# Patient Record
Sex: FEMALE | Race: WHITE | NOT HISPANIC OR LATINO | Employment: UNEMPLOYED | URBAN - NONMETROPOLITAN AREA
[De-identification: names, ages, dates, MRNs, and addresses within clinical notes are randomized per-mention and may not be internally consistent; named-entity substitution may affect disease eponyms.]

---

## 2017-02-28 ENCOUNTER — TELEPHONE (OUTPATIENT)
Dept: MEDICAL GROUP | Facility: PHYSICIAN GROUP | Age: 32
End: 2017-02-28

## 2017-02-28 DIAGNOSIS — R06.02 SHORTNESS OF BREATH: ICD-10-CM

## 2017-02-28 NOTE — TELEPHONE ENCOUNTER
1. Caller Name: Zeian                                      Call Back Number: [phone]      Patient approves a detailed voicemail message: N\A    2. SPECIFIC Action To Be Taken: referral    3. Diagnosis/Clinical Reason for Request: SOB / knee issues    4. Specialty & Provider Name/Lab/Imaging Location: Gynocology / PT / PFT    5. Is appointment scheduled for requested order/referral: no    Patient informed they will receive a return phone call from the office ONLY if there are any questions before processing their request. Advised to call back if they haven't received a call from the referral department in 5 days.  Pt called stating that no one except for dermatology has called her for the referrals that were placed at the end of Nov '16.  I gave her the information she needed to contact to get these referrals scheduled.  I also mailed to her home, the referrals information.      She is looking for a PFT order so she can schedule this.  I seen that this was done in 9/16.  Order may be too old, please re order PFT.  Pt is looking to schedule this test.      She said thank you.

## 2017-03-07 RX ORDER — VALACYCLOVIR HYDROCHLORIDE 500 MG/1
TABLET, FILM COATED ORAL
Qty: 30 TAB | Refills: 1 | Status: SHIPPED | OUTPATIENT
Start: 2017-03-07 | End: 2018-02-02 | Stop reason: SDUPTHER

## 2017-07-25 ENCOUNTER — OFFICE VISIT (OUTPATIENT)
Dept: URGENT CARE | Facility: PHYSICIAN GROUP | Age: 32
End: 2017-07-25
Payer: OTHER GOVERNMENT

## 2017-07-25 ENCOUNTER — HOSPITAL ENCOUNTER (OUTPATIENT)
Facility: MEDICAL CENTER | Age: 32
End: 2017-07-25
Attending: NURSE PRACTITIONER
Payer: OTHER GOVERNMENT

## 2017-07-25 VITALS
BODY MASS INDEX: 23.63 KG/M2 | TEMPERATURE: 98.7 F | HEART RATE: 96 BPM | RESPIRATION RATE: 16 BRPM | WEIGHT: 147 LBS | OXYGEN SATURATION: 94 % | HEIGHT: 66 IN | DIASTOLIC BLOOD PRESSURE: 60 MMHG | SYSTOLIC BLOOD PRESSURE: 104 MMHG

## 2017-07-25 DIAGNOSIS — R31.9 URINARY TRACT INFECTION WITH HEMATURIA, SITE UNSPECIFIED: ICD-10-CM

## 2017-07-25 DIAGNOSIS — N39.0 URINARY TRACT INFECTION WITH HEMATURIA, SITE UNSPECIFIED: ICD-10-CM

## 2017-07-25 DIAGNOSIS — R30.0 DYSURIA: ICD-10-CM

## 2017-07-25 LAB
APPEARANCE UR: ABNORMAL
BILIRUB UR STRIP-MCNC: ABNORMAL MG/DL
COLOR UR AUTO: ABNORMAL
GLUCOSE UR STRIP.AUTO-MCNC: ABNORMAL MG/DL
INT CON NEG: NORMAL
INT CON POS: NORMAL
KETONES UR STRIP.AUTO-MCNC: ABNORMAL MG/DL
LEUKOCYTE ESTERASE UR QL STRIP.AUTO: ABNORMAL
NITRITE UR QL STRIP.AUTO: ABNORMAL
PH UR STRIP.AUTO: 8.5 [PH] (ref 5–8)
POC URINE PREGNANCY TEST: NORMAL
PROT UR QL STRIP: ABNORMAL MG/DL
RBC UR QL AUTO: ABNORMAL
SP GR UR STRIP.AUTO: 1.01
UROBILINOGEN UR STRIP-MCNC: ABNORMAL MG/DL

## 2017-07-25 PROCEDURE — 87077 CULTURE AEROBIC IDENTIFY: CPT

## 2017-07-25 PROCEDURE — 81025 URINE PREGNANCY TEST: CPT | Performed by: NURSE PRACTITIONER

## 2017-07-25 PROCEDURE — 87186 SC STD MICRODIL/AGAR DIL: CPT

## 2017-07-25 PROCEDURE — 81002 URINALYSIS NONAUTO W/O SCOPE: CPT | Performed by: NURSE PRACTITIONER

## 2017-07-25 PROCEDURE — 87086 URINE CULTURE/COLONY COUNT: CPT

## 2017-07-25 PROCEDURE — 99204 OFFICE O/P NEW MOD 45 MIN: CPT | Performed by: NURSE PRACTITIONER

## 2017-07-25 RX ORDER — NITROFURANTOIN 25; 75 MG/1; MG/1
100 CAPSULE ORAL 2 TIMES DAILY
Qty: 14 CAP | Refills: 0 | Status: SHIPPED | OUTPATIENT
Start: 2017-07-25 | End: 2017-08-01

## 2017-07-25 ASSESSMENT — ENCOUNTER SYMPTOMS
CHILLS: 0
MYALGIAS: 1
BACK PAIN: 1
CONSTIPATION: 0
FEVER: 0
DIARRHEA: 0
NAUSEA: 0
WEAKNESS: 0
ABDOMINAL PAIN: 1
FLANK PAIN: 0
HEADACHES: 0
DIZZINESS: 0
VOMITING: 0

## 2017-07-25 NOTE — PROGRESS NOTES
"Subjective:      Gwendolyn Hernandez is a 32 y.o. female who presents with Back Pain            Back Pain  Associated symptoms include abdominal pain and dysuria. Pertinent negatives include no fever, headaches or weakness.   Gwendolyn is a 32 year old female who is here for dysuria x 4 days. States bilat low back pain, no hematuria or dark urine. Denies fever, n/v/d, but has low pelvic pressure but will get this with menstrual cycle.  Has had a partial hysterectomy, still has ovaries and will get cysts on her ovaries. Denies yeast infections, vaginal d/c. Menstrual cycle will be starting \"soon\". States recent sexual intercourse with  in the last week. States no previous UTI history.    PMH:  has a past medical history of Anemia; Gynecological disorder; Arthritis; Black-out (not amnesia); and Infectious disease. She also has no past medical history of Blood transfusion without reported diagnosis or Cushings syndrome (CMS-[facility]).  MEDS:   Current outpatient prescriptions:   •  nitrofurantoin monohydr macro (MACROBID) 100 MG Cap, Take 1 Cap by mouth 2 times a day for 7 days., Disp: 14 Cap, Rfl: 0  •  valacyclovir (VALTREX) 500 MG Tab, Take twice a day for 3-5 days during outbreak, Disp: 30 Tab, Rfl: 1  •  naproxen (NAPROSYN) 500 MG Tab, Take 1 Tab by mouth 2 times a day, with meals., Disp: 60 Tab, Rfl: 3  ALLERGIES:   Allergies   Allergen Reactions   • Amoxicillin Hives   • Trazodone Shortness of Breath     SURGHX:   Past Surgical History   Procedure Laterality Date   • Cholecystectomy     • Other  4-1-2016 teeth extraction   • Other  3-2016     left shoulder lump removed   • Hysterectomy robotic Bilateral 5/6/2016     Procedure: HYSTERECTOMY ROBOTIC WITH MODESTA SALPINGECTOMY ;  Surgeon: Samantha Barnes M.D.;  Location: SURGERY SAME DAY [facility];  Service:      SOCHX:  reports that she has never smoked. She has never used smokeless tobacco. She reports that she does not drink alcohol or use illicit drugs.  FH: Family history " "was reviewed, no pertinent findings to report      Review of Systems   Constitutional: Negative for fever, chills and malaise/fatigue.   Gastrointestinal: Positive for abdominal pain. Negative for nausea, vomiting, diarrhea and constipation.   Genitourinary: Positive for dysuria, urgency and frequency. Negative for hematuria and flank pain.   Musculoskeletal: Positive for myalgias and back pain.   Neurological: Negative for dizziness, weakness and headaches.   All other systems reviewed and are negative.         Objective:     /60 mmHg  Pulse 96  Temp(Src) 37.1 °C (98.7 °F)  Resp 16  Ht 1.676 m (5' 6\")  Wt 66.679 kg (147 lb)  BMI 23.74 kg/m2  SpO2 94%  LMP 04/27/2015     Physical Exam   Constitutional: She is oriented to person, place, and time. Vital signs are normal. She appears well-developed and well-nourished. She is active and cooperative.  Non-toxic appearance. She does not have a sickly appearance. She does not appear ill. No distress.   HENT:   Head: Normocephalic.   Eyes: Conjunctivae and EOM are normal. Pupils are equal, round, and reactive to light.   Cardiovascular: Normal rate.    Pulmonary/Chest: Effort normal.   Abdominal: Soft. Bowel sounds are normal. She exhibits no distension. There is no tenderness. There is no rigidity, no rebound, no guarding and no CVA tenderness.   Musculoskeletal: Normal range of motion.        Lumbar back: She exhibits pain. She exhibits normal range of motion, no tenderness, no bony tenderness, no swelling, no edema, no deformity and no spasm.        Back:    Neurological: She is alert and oriented to person, place, and time.   Skin: Skin is warm and dry. She is not diaphoretic.   Vitals reviewed.              Assessment/Plan:     1. Dysuria    - POCT Urinalysis  - POCT Pregnancy: NEG    2. Urinary tract infection with hematuria, site unspecified    - URINE CULTURE(NEW); Future  - nitrofurantoin monohydr macro (MACROBID) 100 MG Cap; Take 1 Cap by mouth 2 " times a day for 7 days.  Dispense: 14 Cap; Refill: 0    Increase water intake  Urinate more frequently and empty bladder completely  Practice good toileting hygiene after bowel movements and sexual intercourse, refrain from sexual intercourse until infection cleared  Monitor for back/flank pain, difficulty urinating, blood in urine- need re-evaluation

## 2017-07-26 DIAGNOSIS — R31.9 URINARY TRACT INFECTION WITH HEMATURIA, SITE UNSPECIFIED: ICD-10-CM

## 2017-07-26 DIAGNOSIS — N39.0 URINARY TRACT INFECTION WITH HEMATURIA, SITE UNSPECIFIED: ICD-10-CM

## 2017-07-28 LAB
BACTERIA UR CULT: ABNORMAL
SIGNIFICANT IND 70042: ABNORMAL
SITE SITE: ABNORMAL
SOURCE SOURCE: ABNORMAL

## 2018-02-02 ENCOUNTER — OFFICE VISIT (OUTPATIENT)
Dept: MEDICAL GROUP | Facility: PHYSICIAN GROUP | Age: 33
End: 2018-02-02
Payer: OTHER GOVERNMENT

## 2018-02-02 ENCOUNTER — HOSPITAL ENCOUNTER (OUTPATIENT)
Dept: LAB | Facility: MEDICAL CENTER | Age: 33
End: 2018-02-02
Attending: NURSE PRACTITIONER
Payer: OTHER GOVERNMENT

## 2018-02-02 VITALS
OXYGEN SATURATION: 99 % | HEIGHT: 66 IN | BODY MASS INDEX: 23.72 KG/M2 | HEART RATE: 87 BPM | TEMPERATURE: 98.3 F | WEIGHT: 147.6 LBS | RESPIRATION RATE: 16 BRPM | SYSTOLIC BLOOD PRESSURE: 106 MMHG | DIASTOLIC BLOOD PRESSURE: 64 MMHG

## 2018-02-02 DIAGNOSIS — Z01.419 WELL WOMAN EXAM: ICD-10-CM

## 2018-02-02 DIAGNOSIS — G89.29 CHRONIC PAIN OF RIGHT KNEE: ICD-10-CM

## 2018-02-02 DIAGNOSIS — M25.561 CHRONIC PAIN OF RIGHT KNEE: ICD-10-CM

## 2018-02-02 DIAGNOSIS — Z79.891 CHRONIC USE OF OPIATE DRUGS THERAPEUTIC PURPOSES: ICD-10-CM

## 2018-02-02 DIAGNOSIS — Z20.2 EXPOSURE TO STD: ICD-10-CM

## 2018-02-02 DIAGNOSIS — B00.9 HSV (HERPES SIMPLEX VIRUS) INFECTION: ICD-10-CM

## 2018-02-02 LAB
HCV AB SER QL: NEGATIVE
HIV 1+2 AB+HIV1 P24 AG SERPL QL IA: NON REACTIVE
TREPONEMA PALLIDUM IGG+IGM AB [PRESENCE] IN SERUM OR PLASMA BY IMMUNOASSAY: NON REACTIVE

## 2018-02-02 PROCEDURE — 86803 HEPATITIS C AB TEST: CPT

## 2018-02-02 PROCEDURE — 87591 N.GONORRHOEAE DNA AMP PROB: CPT

## 2018-02-02 PROCEDURE — 36415 COLL VENOUS BLD VENIPUNCTURE: CPT

## 2018-02-02 PROCEDURE — 86780 TREPONEMA PALLIDUM: CPT

## 2018-02-02 PROCEDURE — 87491 CHLMYD TRACH DNA AMP PROBE: CPT

## 2018-02-02 PROCEDURE — 87389 HIV-1 AG W/HIV-1&-2 AB AG IA: CPT

## 2018-02-02 PROCEDURE — 99214 OFFICE O/P EST MOD 30 MIN: CPT | Performed by: NURSE PRACTITIONER

## 2018-02-02 RX ORDER — VALACYCLOVIR HYDROCHLORIDE 500 MG/1
TABLET, FILM COATED ORAL
Qty: 30 TAB | Refills: 1 | Status: SHIPPED | OUTPATIENT
Start: 2018-02-02 | End: 2018-09-06 | Stop reason: SDUPTHER

## 2018-02-02 RX ORDER — HYDROCODONE BITARTRATE AND ACETAMINOPHEN 5; 325 MG/1; MG/1
1 TABLET ORAL EVERY 8 HOURS PRN
Qty: 30 TAB | Refills: 0 | Status: SHIPPED | OUTPATIENT
Start: 2018-02-02 | End: 2018-09-06

## 2018-02-02 NOTE — PROGRESS NOTES
Chief Complaint   Patient presents with   • Annual Exam     STD check       HISTORY OF PRESENT ILLNESS: Patient is a @ age 32 here  today to discuss:     Interval history:     Hospitalizations  - NO     Injuries  NO     Illness  NO         Exposure to STD  Patient states she is possibly exposed. Will plan labs. Patient contracted HSV with her ex- so she likes to have routine checks at least once a year. No current partner. Last STD check was all negative.    HSV (herpes simplex virus) infection  Patient takes Valtrex for suppression. Last episode was December 2017. Patient needs refill.     Right knee pain  Patient states she uses hydrocodone 5 mg/ 325 mg intermittently for pain in the knee. She uses up to 30 tablets in one year. Patient has had thorough evaluation which included physical therapy, visit to orthopedics and x-ray. She normally takes naproxen for pain periodically the pain gets so intense that she will take a hydrocodone. She states that 30 tablets last her one year. She was given the information regarding the use of narcotics for severe pain and the potential problem of addiction or dependency. She is used is in the past without any difficulty. She still will be consented and urine drug screen will be performed today    Chronic use of opiate drugs therapeutic purposes  This patient is continuing to use a controlled substance (CS) on a long term basis.  The patient is thoroughly aware of the goals of treatment with the CS  The patient is aware that yearly and random urine drug screens are required.  The patient has been instructed to take the CS only as prescribed.  The patient is prohibited from sharing the CS with any other person.  The patient is instructed to inform the provider if any other CS is taken, of any alcohol or cannabis or other recreational drug use, any treatment for side effects of the CS or complications, if they have CS active rx in other states  The patient has evidence for  "a reason for the CS  The treatment plan has been discussed with the patient  The  report has been reviewed    Opioid Risk Score: 8     Interpretation of Opioid Risk Score   Score 0-3 = Low risk of abuse. Do UDS at least once per year.  Score 4-7 = Moderate risk of abuse. Do UDS 1-4 times per year.  Score 8+ = High risk of abuse. Refer to specialist.        Allergies:Amoxicillin and Trazodone    Current Outpatient Prescriptions Ordered in [facility]   Medication Sig Dispense Refill   • HYDROcodone-acetaminophen (NORCO) 5-325 MG Tab per tablet Take 1 Tab by mouth every 8 hours as needed. 30 Tab 0   • valacyclovir (VALTREX) 500 MG Tab Take twice a day for 3-5 days during outbreak 30 Tab 1   • naproxen (NAPROSYN) 500 MG Tab Take 1 Tab by mouth 2 times a day, with meals. 60 Tab 3     No current Epic-ordered facility-administered medications on file.        Past Medical History:   Diagnosis Date   • Anemia    • Arthritis    • Black-out (not amnesia)    • Gynecological disorder    • Infectious disease     pt's son has croop       Social History   Substance Use Topics   • Smoking status: Never Smoker   • Smokeless tobacco: Never Used   • Alcohol use No       Family Status   Relation Status   • Mother Alive   • Father Alive     Family History   Problem Relation Age of Onset   • Genitourinary () Mother    • Cancer Father        ROS: As documented in my HPI      Exam:  Blood pressure 106/64, pulse 87, temperature 36.8 °C (98.3 °F), resp. rate 16, height 1.676 m (5' 6\"), weight 67 kg (147 lb 9.6 oz), last menstrual period 04/27/2015, SpO2 99 %.  General:  Well nourished, well developed female in NAD  Head: Nontender scalp. No lesions  Neck: Supple. Symmetric Thyroid palpated. No bruits  Pulmonary:  Normal effort. No rales, ronchi, or wheezing.  Cardiovascular: Regular rate and rhythm without murmur.   Abdomen: Soft nontender, normal bowel sounds  Extremities: Right knee: No swelling or redness noted tender at the joint line. " Joint stability present  Psych: Alert and oriented x3.  Neurological: No focal deficits    Please note that this dictation was created using voice recognition software. I have made every reasonable attempt to correct obvious errors, but I expect that there are errors of grammar and possibly content that I did not discover before finalizing the note.    Assessment/Plan:   Exposure to STD  HEP C VIRUS ANTIBODY    HIV AG/AB COMBO ASSAY SCREENING    CHLAMYDIA/GC PCR URINE OR SWAB    RPR QUAL W/REFLEX     HEP C VIRUS ANTIBODY    HIV AG/AB COMBO ASSAY SCREENING    CHLAMYDIA/GC PCR URINE OR SWAB    RPR QUAL W/REFLEX    HSV (herpes simplex virus) infection   Valtrex refilled.     Chronic pain of right knee  [facility] PAIN MANAGEMENT SCREEN    Controlled Substance Treatment Agreement    HYDROcodone-acetaminophen (NORCO) 5-325 MG Tab per tablet    Chronic use of opiate drugs therapeutic purposes  [facility] PAIN MANAGEMENT SCREEN    Controlled Substance Treatment Agreement    HYDROcodone-acetaminophen (NORCO) 5-325 MG Tab per tablet medication to last one year #30 tablets.

## 2018-02-02 NOTE — ASSESSMENT & PLAN NOTE
Patient states she uses hydrocodone 5 mg/ 325 mg intermittently for pain in the knee. She uses up to 30 tablets in one year. Patient has had thorough evaluation which included physical therapy, visit to orthopedics and x-ray. She normally takes naproxen for pain periodically the pain gets so intense that she will take a hydrocodone. She states that 30 tablets last her one year. She was given the information regarding the use of narcotics for severe pain and the potential problem of addiction or dependency. She is used is in the past without any difficulty. She still will be consented and urine drug screen will be performed today

## 2018-02-03 LAB
C TRACH DNA SPEC QL NAA+PROBE: POSITIVE
N GONORRHOEA DNA SPEC QL NAA+PROBE: NEGATIVE
SPECIMEN SOURCE: ABNORMAL

## 2018-02-04 ENCOUNTER — TELEPHONE (OUTPATIENT)
Dept: MEDICAL GROUP | Facility: PHYSICIAN GROUP | Age: 33
End: 2018-02-04

## 2018-02-04 RX ORDER — AZITHROMYCIN 1 G/1
1 POWDER, FOR SUSPENSION ORAL ONCE
Qty: 1 EACH | Refills: 0 | Status: SHIPPED | OUTPATIENT
Start: 2018-02-04 | End: 2018-02-04

## 2018-02-05 ENCOUNTER — TELEPHONE (OUTPATIENT)
Dept: MEDICAL GROUP | Facility: PHYSICIAN GROUP | Age: 33
End: 2018-02-05

## 2018-02-05 NOTE — TELEPHONE ENCOUNTER
Positive chlamydia. RX sent to pharmacy. My chart message sent. Please call patient to back up information. I tried to leave message Sunday - voicemail.   [place] fill out County reporting paperwork.  Thanks,  Kaycee Brown

## 2018-02-06 ENCOUNTER — PATIENT MESSAGE (OUTPATIENT)
Dept: MEDICAL GROUP | Facility: PHYSICIAN GROUP | Age: 33
End: 2018-02-06

## 2018-02-06 NOTE — TELEPHONE ENCOUNTER
Barbara with [facility] calling with positive results.  Kaycee has already addressed this with patient.

## 2018-02-07 ENCOUNTER — PATIENT MESSAGE (OUTPATIENT)
Dept: MEDICAL GROUP | Facility: PHYSICIAN GROUP | Age: 33
End: 2018-02-07

## 2018-02-08 NOTE — TELEPHONE ENCOUNTER
From: Gwendolyn Hernandez  To: LAKIA Macedo  Sent: 2/7/2018 2:05 PM PST  Subject: Non-Urgent Medical Question    Can I come in before the 3 months and get tested to make sure the antibiotic worked? Can I go to the lab and get them to retest me this week or next week please? I took the medicine Monday morning. If it comes up positive after retesting then do I take another dose? Sorry this is not something I have not dealt with so I want to make sure I am clean. My mind won't stop thinking about it.

## 2018-02-12 ENCOUNTER — OFFICE VISIT (OUTPATIENT)
Dept: URGENT CARE | Facility: PHYSICIAN GROUP | Age: 33
End: 2018-02-12
Payer: OTHER GOVERNMENT

## 2018-02-12 VITALS
HEART RATE: 80 BPM | RESPIRATION RATE: 14 BRPM | WEIGHT: 150 LBS | OXYGEN SATURATION: 97 % | DIASTOLIC BLOOD PRESSURE: 60 MMHG | SYSTOLIC BLOOD PRESSURE: 110 MMHG | HEIGHT: 66 IN | BODY MASS INDEX: 24.11 KG/M2 | TEMPERATURE: 98.1 F

## 2018-02-12 DIAGNOSIS — T36.95XA ANTIBIOTIC-INDUCED YEAST INFECTION: ICD-10-CM

## 2018-02-12 DIAGNOSIS — L73.9 FOLLICULITIS: ICD-10-CM

## 2018-02-12 DIAGNOSIS — B37.9 ANTIBIOTIC-INDUCED YEAST INFECTION: ICD-10-CM

## 2018-02-12 DIAGNOSIS — Z86.19 H/O CHLAMYDIA INFECTION: ICD-10-CM

## 2018-02-12 PROCEDURE — 99214 OFFICE O/P EST MOD 30 MIN: CPT | Performed by: FAMILY MEDICINE

## 2018-02-12 RX ORDER — MUPIROCIN CALCIUM 20 MG/G
CREAM TOPICAL
Qty: 1 TUBE | Refills: 0 | Status: SHIPPED | OUTPATIENT
Start: 2018-02-12 | End: 2018-09-06

## 2018-02-12 RX ORDER — FLUCONAZOLE 150 MG/1
150 TABLET ORAL DAILY
Qty: 1 TAB | Refills: 0 | Status: SHIPPED | OUTPATIENT
Start: 2018-02-12 | End: 2018-09-06

## 2018-02-12 NOTE — PROGRESS NOTES
Subjective:      Gwendolyn Hernandez is a 32 y.o. female who presents with Medication Reaction (Possible reaction to previously given medication)    Patient was recently seen in the urgent care and diagnosed and treated for chlamydia infection with 1 g of azithromycin. She states over the past several days she has had new problem of itching of her upper or lower back and flank area she has not noticed a rash or hives. She denies any difficulty breathing no tongue swelling no shortness of breath. Wondered whether allergic reaction to antibiotic.      She's also had new vaginal itching and irritation feels that she is having a difference discharge from what she was having when she came in and was tested for chlamydia. She denies any fevers or chills no pelvic pain.    Concerned whether she is totally cleared of chamydia infection as well. No pelvic pain.     HPI  ROS Review of Systems performed. All other systems are negative except for what is listed above.     PMH:  has a past medical history of Anemia; Arthritis; Black-out (not amnesia); Gynecological disorder; and Infectious disease. She also has no past medical history of Blood transfusion without reported diagnosis or Cushings syndrome (CMS-[facility]).  MEDS:   Current Outpatient Prescriptions:   •  HYDROcodone-acetaminophen (NORCO) 5-325 MG Tab per tablet, Take 1 Tab by mouth every 8 hours as needed., Disp: 30 Tab, Rfl: 0  •  valACYclovir (VALTREX) 500 MG Tab, Take twice a day for 3-5 days during outbreak, Disp: 30 Tab, Rfl: 1  •  naproxen (NAPROSYN) 500 MG Tab, Take 1 Tab by mouth 2 times a day, with meals., Disp: 60 Tab, Rfl: 3  ALLERGIES:   Allergies   Allergen Reactions   • Amoxicillin Hives   • Trazodone Shortness of Breath   SURGHX:   Past Surgical History:   Procedure Laterality Date   • HYSTERECTOMY ROBOTIC Bilateral 5/6/2016    Procedure: HYSTERECTOMY ROBOTIC WITH MODESTA SALPINGECTOMY ;  Surgeon: Samantha Barnes M.D.;  Location: SURGERY SAME DAY [facility];  Service:   "  • OTHER  3-2016    left shoulder lump removed   • CHOLECYSTECTOMY     • OTHER  4-1-2016 teeth extraction   SOCHX:  reports that she has never smoked. She has never used smokeless tobacco. She reports that she does not drink alcohol or use drugs.  FH: Family history was reviewed, no pertinent findings to report     Objective:     /60   Pulse 80   Temp 36.7 °C (98.1 °F)   Resp 14   Ht 1.676 m (5' 6\")   Wt 68 kg (150 lb)   LMP 04/27/2015   SpO2 97%   BMI 24.21 kg/m²      Physical Exam   Constitutional: She is oriented to person, place, and time. She appears well-developed and well-nourished. No distress.   HENT:   Mouth/Throat: Oropharynx is clear and moist.   Eyes: Conjunctivae are normal.   Cardiovascular: Normal rate, regular rhythm, normal heart sounds and intact distal pulses.  Exam reveals no gallop and no friction rub.    No murmur heard.  Pulmonary/Chest: Effort normal and breath sounds normal. No respiratory distress. She has no wheezes. She has no rales. She exhibits no tenderness.   Genitourinary:   Genitourinary Comments: deferred   Musculoskeletal: Normal range of motion. She exhibits no edema, tenderness or deformity.        Arms:  Discrete small pustular lesions noted diffusely on back   Neurological: She is alert and oriented to person, place, and time.   Skin: Skin is warm and dry. Rash noted. She is not diaphoretic.   Psychiatric: She has a normal mood and affect. Her behavior is normal.          Assessment/Plan:     1. Folliculitis     2. Antibiotic-induced yeast infection     3. H/O chlamydia infection       Diflucan  Bactroban topical  Patient is severely concerned about its whether her chlamydia infection is completely resolved I stated that she could come back to urgent care or primary care provider in 7 days for repeat test of cure this is her second time having chlamydia in her lifetime          "

## 2018-03-30 ENCOUNTER — HOSPITAL ENCOUNTER (OUTPATIENT)
Dept: LAB | Facility: MEDICAL CENTER | Age: 33
End: 2018-03-30
Attending: ANESTHESIOLOGY
Payer: OTHER GOVERNMENT

## 2018-03-30 LAB
HCG SERPL QL: NEGATIVE
HCT VFR BLD AUTO: 41 % (ref 37–47)

## 2018-03-30 PROCEDURE — 36415 COLL VENOUS BLD VENIPUNCTURE: CPT

## 2018-03-30 PROCEDURE — 84703 CHORIONIC GONADOTROPIN ASSAY: CPT

## 2018-03-30 PROCEDURE — 85014 HEMATOCRIT: CPT

## 2018-05-15 ENCOUNTER — PATIENT MESSAGE (OUTPATIENT)
Dept: MEDICAL GROUP | Facility: PHYSICIAN GROUP | Age: 33
End: 2018-05-15

## 2018-05-25 ENCOUNTER — HOSPITAL ENCOUNTER (OUTPATIENT)
Facility: MEDICAL CENTER | Age: 33
End: 2018-05-25
Attending: PHYSICIAN ASSISTANT
Payer: OTHER GOVERNMENT

## 2018-05-25 ENCOUNTER — OFFICE VISIT (OUTPATIENT)
Dept: URGENT CARE | Facility: PHYSICIAN GROUP | Age: 33
End: 2018-05-25
Payer: OTHER GOVERNMENT

## 2018-05-25 VITALS
WEIGHT: 152 LBS | HEIGHT: 67 IN | HEART RATE: 72 BPM | DIASTOLIC BLOOD PRESSURE: 72 MMHG | RESPIRATION RATE: 16 BRPM | BODY MASS INDEX: 23.86 KG/M2 | TEMPERATURE: 98.1 F | SYSTOLIC BLOOD PRESSURE: 110 MMHG | OXYGEN SATURATION: 97 %

## 2018-05-25 DIAGNOSIS — R10.2 PELVIC PAIN: ICD-10-CM

## 2018-05-25 PROCEDURE — 87491 CHLMYD TRACH DNA AMP PROBE: CPT

## 2018-05-25 PROCEDURE — 87591 N.GONORRHOEAE DNA AMP PROB: CPT

## 2018-05-25 PROCEDURE — 99214 OFFICE O/P EST MOD 30 MIN: CPT | Performed by: PHYSICIAN ASSISTANT

## 2018-05-25 ASSESSMENT — ENCOUNTER SYMPTOMS
DIARRHEA: 0
ABDOMINAL PAIN: 0
CHILLS: 0
MYALGIAS: 0
NAUSEA: 0
COUGH: 0
BACK PAIN: 0
SHORTNESS OF BREATH: 0
VOMITING: 0
FLANK PAIN: 0
FEVER: 0
HEADACHES: 0

## 2018-05-25 ASSESSMENT — PAIN SCALES - GENERAL: PAINLEVEL: 4=SLIGHT-MODERATE PAIN

## 2018-05-25 NOTE — PROGRESS NOTES
"Subjective:      Gwendolyn Hernandez is a 33 y.o. female who presents with Exposure to STD (was positive in Feb)            Patient is here with complaints of pelvic pain x 3 months. She was exposed to STD 3 months ago (chlamydia). Patient was treated for Chlamydia but continues to have pain. She complains of intermittent shooting pains around where her ovaries are. She denies abnormal discharge or bleeding. Patient has hx of partial hysterectomy. She denies fever or chills. She denies urinary symptoms. She is requesting repeat STD testing.      Past Medical History:   Diagnosis Date   • Anemia    • Arthritis    • Black-out (not amnesia)    • Gynecological disorder    • Infectious disease     pt's son has croop       Past Surgical History:   Procedure Laterality Date   • HYSTERECTOMY ROBOTIC Bilateral 5/6/2016    Procedure: HYSTERECTOMY ROBOTIC WITH MODESTA SALPINGECTOMY ;  Surgeon: Samantha Barnes M.D.;  Location: SURGERY SAME DAY [facility];  Service:    • OTHER  3-2016    left shoulder lump removed   • CHOLECYSTECTOMY     • OTHER  4-1-2016 teeth extraction       Family History   Problem Relation Age of Onset   • Genitourinary () Mother    • Cancer Father        Allergies   Allergen Reactions   • Amoxicillin Hives   • Trazodone Shortness of Breath       Medications, Allergies, and current problem list reviewed today in Epic      Review of Systems   Constitutional: Negative for chills, fever and malaise/fatigue.   Respiratory: Negative for cough and shortness of breath.    Gastrointestinal: Negative for abdominal pain, diarrhea, nausea and vomiting.   Genitourinary: Negative for dysuria, flank pain, frequency, hematuria and urgency.        Pelvic pain    Musculoskeletal: Negative for back pain and myalgias.   Skin: Negative for rash.   Neurological: Negative for headaches.     All other systems reviewed and are negative.        Objective:     /72   Pulse 72   Temp 36.7 °C (98.1 °F)   Resp 16   Ht 1.702 m (5' 7\")   " Wt 68.9 kg (152 lb)   LMP 04/27/2015   SpO2 97%   BMI 23.81 kg/m²      Physical Exam   Constitutional: She is oriented to person, place, and time. She appears well-developed and well-nourished. No distress.   Pulmonary/Chest: Effort normal. No respiratory distress.   Genitourinary: Uterus normal. Cervix exhibits discharge. Cervix exhibits no motion tenderness and no friability. Right adnexum displays no mass, no tenderness and no fullness. Left adnexum displays no mass, no tenderness and no fullness. No erythema in the vagina. No signs of injury around the vagina. Vaginal discharge (white discharge ) found.   Lymphadenopathy:        Right: No inguinal adenopathy present.        Left: No inguinal adenopathy present.   Neurological: She is alert and oriented to person, place, and time. No cranial nerve deficit.   Psychiatric: She has a normal mood and affect. Her behavior is normal. Judgment and thought content normal.               Assessment/Plan:     1. Pelvic pain  CHLAMYDIA/GC PCR URINE OR SWAB    US-GYN-PELVIS TRANSVAGINAL       Will recheck GC/Chlamydia.  US pelvis ordered for patient to schedule.  Will follow-up with patient accordingly.   Patient prefers to be notified via [employer].     Differential diagnoses, Supportive care, and indications for immediate follow-up discussed with patient.   Instructed to return to clinic or nearest emergency department for any change in condition, further concerns, or worsening of symptoms.    The patient demonstrated a good understanding and agreed with the treatment plan.    Vanessa Navarro P.A.-C.

## 2018-05-26 LAB
C TRACH DNA SPEC QL NAA+PROBE: NEGATIVE
N GONORRHOEA DNA SPEC QL NAA+PROBE: NEGATIVE
SPECIMEN SOURCE: NORMAL

## 2018-06-06 ENCOUNTER — HOSPITAL ENCOUNTER (OUTPATIENT)
Dept: RADIOLOGY | Facility: MEDICAL CENTER | Age: 33
End: 2018-06-06
Attending: PHYSICIAN ASSISTANT
Payer: OTHER GOVERNMENT

## 2018-06-06 DIAGNOSIS — R10.2 PELVIC PAIN: ICD-10-CM

## 2018-06-06 PROCEDURE — 76830 TRANSVAGINAL US NON-OB: CPT

## 2018-08-15 ENCOUNTER — HOSPITAL ENCOUNTER (OUTPATIENT)
Facility: MEDICAL CENTER | Age: 33
End: 2018-08-15
Attending: PHYSICIAN ASSISTANT
Payer: OTHER GOVERNMENT

## 2018-08-15 ENCOUNTER — OFFICE VISIT (OUTPATIENT)
Dept: URGENT CARE | Facility: PHYSICIAN GROUP | Age: 33
End: 2018-08-15
Payer: OTHER GOVERNMENT

## 2018-08-15 VITALS
SYSTOLIC BLOOD PRESSURE: 108 MMHG | HEIGHT: 67 IN | HEART RATE: 90 BPM | BODY MASS INDEX: 23.89 KG/M2 | WEIGHT: 152.2 LBS | RESPIRATION RATE: 16 BRPM | OXYGEN SATURATION: 97 % | DIASTOLIC BLOOD PRESSURE: 58 MMHG | TEMPERATURE: 99 F

## 2018-08-15 DIAGNOSIS — R10.30 LOWER ABDOMINAL PAIN: ICD-10-CM

## 2018-08-15 DIAGNOSIS — N89.8 VAGINAL DISCHARGE: ICD-10-CM

## 2018-08-15 LAB
APPEARANCE UR: CLEAR
BILIRUB UR STRIP-MCNC: NORMAL MG/DL
COLOR UR AUTO: YELLOW
GLUCOSE UR STRIP.AUTO-MCNC: NORMAL MG/DL
KETONES UR STRIP.AUTO-MCNC: NORMAL MG/DL
LEUKOCYTE ESTERASE UR QL STRIP.AUTO: NORMAL
NITRITE UR QL STRIP.AUTO: NORMAL
PH UR STRIP.AUTO: 7 [PH] (ref 5–8)
PROT UR QL STRIP: NORMAL MG/DL
RBC UR QL AUTO: NORMAL
SP GR UR STRIP.AUTO: 1.02
UROBILINOGEN UR STRIP-MCNC: 0.2 MG/DL

## 2018-08-15 PROCEDURE — 87510 GARDNER VAG DNA DIR PROBE: CPT

## 2018-08-15 PROCEDURE — 81002 URINALYSIS NONAUTO W/O SCOPE: CPT | Performed by: PHYSICIAN ASSISTANT

## 2018-08-15 PROCEDURE — 87591 N.GONORRHOEAE DNA AMP PROB: CPT

## 2018-08-15 PROCEDURE — 87480 CANDIDA DNA DIR PROBE: CPT

## 2018-08-15 PROCEDURE — 99214 OFFICE O/P EST MOD 30 MIN: CPT | Performed by: PHYSICIAN ASSISTANT

## 2018-08-15 PROCEDURE — 87491 CHLMYD TRACH DNA AMP PROBE: CPT

## 2018-08-15 PROCEDURE — 87660 TRICHOMONAS VAGIN DIR PROBE: CPT

## 2018-08-15 RX ORDER — OXYCODONE AND ACETAMINOPHEN TABLETS 5; 300 MG/1; MG/1
1 TABLET ORAL EVERY 4 HOURS PRN
COMMUNITY
End: 2018-09-06

## 2018-08-15 RX ORDER — METRONIDAZOLE 500 MG/1
500 TABLET ORAL 2 TIMES DAILY
Qty: 14 TAB | Refills: 0 | Status: SHIPPED | OUTPATIENT
Start: 2018-08-15 | End: 2018-08-22

## 2018-08-15 NOTE — PROGRESS NOTES
Chief Complaint   Patient presents with   • Abdominal Pain     unable to leave sample   • Exposure to STD       HISTORY OF PRESENT ILLNESS: Patient is a 33 y.o. female who presents today for the following:    Patient comes in for evaluation of vaginal discharge and lower abdominal pain that started a few days ago.  She was recently visiting her  for a week, the first time she has seen him in approximately 6 or 7 months.  She reports a foul odor to the vaginal discharge.  She denies overt vaginal pain, dysuria, hematuria, nausea, vomiting, and fever.  She has a history of a partial hysterectomy, with 2 ovaries remaining.  She does have a history of ovarian cysts.    Patient Active Problem List    Diagnosis Date Noted   • HSV (herpes simplex virus) infection 02/02/2018   • Chronic use of opiate drugs therapeutic purposes 02/02/2018   • Skin lesion 11/30/2016   • Chronic pelvic pain in female 09/01/2016   • Slow transit constipation 09/01/2016   • Right knee pain 09/01/2016   • Shortness of breath 09/01/2016   • Exposure to STD 09/01/2016   • Abnormal uterine bleeding 05/06/2016   • Lipoma of back 02/18/2016   • Anxiety 11/10/2015   • Pelvic pain 11/10/2015   • LOC (loss of consciousness) (HCC) 11/10/2015       Allergies:Amoxicillin and Trazodone    Current Outpatient Prescriptions Ordered in [facility]   Medication Sig Dispense Refill   • oxycodone-acetaminophen (LYNOX) 5-300 MG per tablet Take 1 Tab by mouth every four hours as needed for Severe Pain.     • metroNIDAZOLE (FLAGYL) 500 MG Tab Take 1 Tab by mouth 2 Times a Day for 7 days. 14 Tab 0   • HYDROcodone-acetaminophen (NORCO) 5-325 MG Tab per tablet Take 1 Tab by mouth every 8 hours as needed. 30 Tab 0   • valACYclovir (VALTREX) 500 MG Tab Take twice a day for 3-5 days during outbreak 30 Tab 1   • mupirocin calcium (BACTROBAN) 2 % Cream Apply a small amount to affected area bid for 7-10 days per treatment round 1 Tube 0   • fluconazole (DIFLUCAN) 150 MG tablet  "Take 1 Tab by mouth every day. 1 Tab 0   • naproxen (NAPROSYN) 500 MG Tab Take 1 Tab by mouth 2 times a day, with meals. 60 Tab 3     No current Epic-ordered facility-administered medications on file.        Past Medical History:   Diagnosis Date   • Anemia    • Arthritis    • Black-out (not amnesia)    • Gynecological disorder    • Infectious disease     pt's son has croop       Social History   Substance Use Topics   • Smoking status: Never Smoker   • Smokeless tobacco: Never Used   • Alcohol use No       Family Status   Relation Status   • Mo Alive   • Fa Alive     Family History   Problem Relation Age of Onset   • Genitourinary () Mother    • Cancer Father        Review of Systems:   Constitutional ROS: No unexpected change in weight, No weakness, No fatigue  Eye ROS: No recent significant change in vision, No eye pain, redness, discharge  Ear ROS: No drainage, No tinnitus or vertigo, No recent change in hearing  Mouth/Throat ROS: No teeth or gum problems, No bleeding gums, No tongue complaints  Neck ROS: No swollen glands, No significant pain in neck  Pulmonary ROS: No chronic cough, sputum, or hemoptysis, No dyspnea on exertion, No wheezing  Cardiovascular ROS: No diaphoresis, No edema, No palpitations  Musculoskeletal/Extremities ROS: No peripheral edema, No pain, redness or swelling on the joints  Hematologic/Lymphatic ROS: No chills, No night sweats, No weight loss  Skin/Integumentary ROS: No edema, No evidence of rash, No itching      Exam:  Blood pressure 108/58, pulse 90, temperature 37.2 °C (99 °F), resp. rate 16, height 1.702 m (5' 7\"), weight 69 kg (152 lb 3.2 oz), last menstrual period 04/27/2015, SpO2 97 %.  General: Well developed, well nourished. No distress.  HEENT: Head is grossly normal.  Pulmonary: Unlabored respiratory effort. Lungs clear to auscultation, no wheezes, no rhonchi.  Cardiovascular: Regular rate and rhythm without murmur.  Neurologic: Grossly nonfocal. No facial asymmetry " noted.  : External genitalia is within normal limits.  Thin grayish/yellowish discharge is noted in the vaginal vault.  Vaginal mucosa is pink.  Slight odor is noted.  Cultures obtained.  Skin: Warm, dry, good turgor. No rashes in visible areas.   Psych: Normal mood. Alert and oriented x3. Judgment and insight is normal.    UA: Negative    Assessment/Plan:  We will treat for bacterial vaginosis as patient's symptoms and exam findings are consistent.  Will contact patient with results.  Follow for worsening or persistent symptoms.  1. Vaginal discharge  CHLAMYDIA/GC PCR URINE OR SWAB    BV+TRICH EUSEBIA+YEAST CULTURE    metroNIDAZOLE (FLAGYL) 500 MG Tab    POCT Urinalysis   2. Lower abdominal pain  CHLAMYDIA/GC PCR URINE OR SWAB    BV+TRICH EUSEBIA+YEAST CULTURE    metroNIDAZOLE (FLAGYL) 500 MG Tab    POCT Urinalysis

## 2018-08-16 LAB
C TRACH DNA SPEC QL NAA+PROBE: NEGATIVE
CANDIDA DNA VAG QL PROBE+SIG AMP: NEGATIVE
G VAGINALIS DNA VAG QL PROBE+SIG AMP: NEGATIVE
N GONORRHOEA DNA SPEC QL NAA+PROBE: NEGATIVE
SPECIMEN SOURCE: NORMAL
T VAGINALIS DNA VAG QL PROBE+SIG AMP: NEGATIVE

## 2018-08-21 ENCOUNTER — TELEPHONE (OUTPATIENT)
Dept: URGENT CARE | Facility: PHYSICIAN GROUP | Age: 33
End: 2018-08-21

## 2018-09-06 ENCOUNTER — OFFICE VISIT (OUTPATIENT)
Dept: MEDICAL GROUP | Facility: PHYSICIAN GROUP | Age: 33
End: 2018-09-06
Payer: OTHER GOVERNMENT

## 2018-09-06 VITALS
TEMPERATURE: 98.9 F | SYSTOLIC BLOOD PRESSURE: 100 MMHG | BODY MASS INDEX: 22.76 KG/M2 | HEIGHT: 67 IN | DIASTOLIC BLOOD PRESSURE: 60 MMHG | WEIGHT: 145 LBS | OXYGEN SATURATION: 96 % | HEART RATE: 84 BPM | RESPIRATION RATE: 16 BRPM

## 2018-09-06 DIAGNOSIS — B00.9 HSV (HERPES SIMPLEX VIRUS) INFECTION: ICD-10-CM

## 2018-09-06 DIAGNOSIS — G89.29 CHRONIC PAIN OF RIGHT KNEE: ICD-10-CM

## 2018-09-06 DIAGNOSIS — Z13.6 SCREENING FOR CARDIOVASCULAR CONDITION: ICD-10-CM

## 2018-09-06 DIAGNOSIS — M25.561 CHRONIC PAIN OF RIGHT KNEE: ICD-10-CM

## 2018-09-06 DIAGNOSIS — Z13.0 ENCOUNTER FOR SCREENING FOR HEMATOLOGIC DISORDER: ICD-10-CM

## 2018-09-06 PROCEDURE — 99214 OFFICE O/P EST MOD 30 MIN: CPT | Performed by: NURSE PRACTITIONER

## 2018-09-06 RX ORDER — VALACYCLOVIR HYDROCHLORIDE 500 MG/1
TABLET, FILM COATED ORAL
Qty: 30 TAB | Refills: 1 | Status: SHIPPED | OUTPATIENT
Start: 2018-09-06 | End: 2021-08-11

## 2018-09-06 ASSESSMENT — PATIENT HEALTH QUESTIONNAIRE - PHQ9: CLINICAL INTERPRETATION OF PHQ2 SCORE: 0
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
## 2018-09-06 NOTE — PATIENT INSTRUCTIONS
Labs anytime in the next week or so    X-ray of R knee    Follow up as needed    Natural products--turmeric, CoQ 10, glucosamine, fish oil    Continue with supportive measures, ACE wrap or brace, go easy on exercises when needed
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
## 2018-09-06 NOTE — ASSESSMENT & PLAN NOTE
Patient does have history of chronic outbreak of herpes infection for which she takes as needed valacyclovir.  She does need refills, this is called in for her.

## 2018-09-14 ENCOUNTER — APPOINTMENT (OUTPATIENT)
Dept: RADIOLOGY | Facility: IMAGING CENTER | Age: 33
End: 2018-09-14
Attending: NURSE PRACTITIONER
Payer: OTHER GOVERNMENT

## 2018-09-14 ENCOUNTER — HOSPITAL ENCOUNTER (OUTPATIENT)
Dept: LAB | Facility: MEDICAL CENTER | Age: 33
End: 2018-09-14
Attending: NURSE PRACTITIONER
Payer: OTHER GOVERNMENT

## 2018-09-14 ENCOUNTER — APPOINTMENT (OUTPATIENT)
Dept: URGENT CARE | Facility: PHYSICIAN GROUP | Age: 33
End: 2018-09-14
Payer: OTHER GOVERNMENT

## 2018-09-14 DIAGNOSIS — Z13.0 ENCOUNTER FOR SCREENING FOR HEMATOLOGIC DISORDER: ICD-10-CM

## 2018-09-14 DIAGNOSIS — G89.29 CHRONIC PAIN OF RIGHT KNEE: ICD-10-CM

## 2018-09-14 DIAGNOSIS — M25.561 CHRONIC PAIN OF RIGHT KNEE: ICD-10-CM

## 2018-09-14 LAB
BASOPHILS # BLD AUTO: 1 % (ref 0–1.8)
BASOPHILS # BLD: 0.08 K/UL (ref 0–0.12)
EOSINOPHIL # BLD AUTO: 0.08 K/UL (ref 0–0.51)
EOSINOPHIL NFR BLD: 1 % (ref 0–6.9)
ERYTHROCYTE [DISTWIDTH] IN BLOOD BY AUTOMATED COUNT: 41.6 FL (ref 35.9–50)
HCT VFR BLD AUTO: 41.6 % (ref 37–47)
HGB BLD-MCNC: 13.4 G/DL (ref 12–16)
IMM GRANULOCYTES # BLD AUTO: 0.03 K/UL (ref 0–0.11)
IMM GRANULOCYTES NFR BLD AUTO: 0.4 % (ref 0–0.9)
LYMPHOCYTES # BLD AUTO: 2.34 K/UL (ref 1–4.8)
LYMPHOCYTES NFR BLD: 27.9 % (ref 22–41)
MCH RBC QN AUTO: 29.7 PG (ref 27–33)
MCHC RBC AUTO-ENTMCNC: 32.2 G/DL (ref 33.6–35)
MCV RBC AUTO: 92.2 FL (ref 81.4–97.8)
MONOCYTES # BLD AUTO: 0.54 K/UL (ref 0–0.85)
MONOCYTES NFR BLD AUTO: 6.4 % (ref 0–13.4)
NEUTROPHILS # BLD AUTO: 5.31 K/UL (ref 2–7.15)
NEUTROPHILS NFR BLD: 63.3 % (ref 44–72)
NRBC # BLD AUTO: 0 K/UL
NRBC BLD-RTO: 0 /100 WBC
PLATELET # BLD AUTO: 325 K/UL (ref 164–446)
PMV BLD AUTO: 11.5 FL (ref 9–12.9)
RBC # BLD AUTO: 4.51 M/UL (ref 4.2–5.4)
WBC # BLD AUTO: 8.4 K/UL (ref 4.8–10.8)

## 2018-09-14 PROCEDURE — 36415 COLL VENOUS BLD VENIPUNCTURE: CPT

## 2018-09-14 PROCEDURE — 73564 X-RAY EXAM KNEE 4 OR MORE: CPT | Mod: TC,FY,RT | Performed by: NURSE PRACTITIONER

## 2018-09-14 PROCEDURE — 85025 COMPLETE CBC W/AUTO DIFF WBC: CPT

## 2018-09-21 ENCOUNTER — TELEPHONE (OUTPATIENT)
Dept: MEDICAL GROUP | Facility: PHYSICIAN GROUP | Age: 33
End: 2018-09-21

## 2018-09-21 NOTE — TELEPHONE ENCOUNTER
I sent the below info to patient via a [employer] message a week ago--pt still has not read message. Please call pt with message. Thank you.        ----- Message from Gwendolyn Hernandez sent at 9/21/2018  5:00 AM PDT -----  Regarding: Results  Gwendolyn,  I have reviewed the results of blood work and knee x-ray. The x-ray is normal, no evidence of dislocation, fracture or soft tissue swelling. Of course the x-ray is not able to adequately look at muscle, tendon and ligaments. Because this is ongoing, we could get you in with an orthopedics specialist or our sports medicine department for further evaluation and treatment. If you are agreeable to this I will place referral.  You blood work was normal.  Samantha

## 2018-09-21 NOTE — TELEPHONE ENCOUNTER
LVM letting pt know to call back to go over x ray and lab results. I also said that you sent a my chart message if she would like to view it that way.

## 2018-09-27 ENCOUNTER — HOSPITAL ENCOUNTER (OUTPATIENT)
Dept: LAB | Facility: MEDICAL CENTER | Age: 33
End: 2018-09-27
Attending: NURSE PRACTITIONER
Payer: OTHER GOVERNMENT

## 2018-09-27 DIAGNOSIS — Z13.6 SCREENING FOR CARDIOVASCULAR CONDITION: ICD-10-CM

## 2018-09-27 LAB
ALBUMIN SERPL BCP-MCNC: 4.2 G/DL (ref 3.2–4.9)
ALBUMIN/GLOB SERPL: 1.5 G/DL
ALP SERPL-CCNC: 53 U/L (ref 30–99)
ALT SERPL-CCNC: 34 U/L (ref 2–50)
ANION GAP SERPL CALC-SCNC: 8 MMOL/L (ref 0–11.9)
AST SERPL-CCNC: 26 U/L (ref 12–45)
BILIRUB SERPL-MCNC: 0.7 MG/DL (ref 0.1–1.5)
BUN SERPL-MCNC: 14 MG/DL (ref 8–22)
CALCIUM SERPL-MCNC: 9.2 MG/DL (ref 8.5–10.5)
CHLORIDE SERPL-SCNC: 106 MMOL/L (ref 96–112)
CHOLEST SERPL-MCNC: 161 MG/DL (ref 100–199)
CO2 SERPL-SCNC: 26 MMOL/L (ref 20–33)
CREAT SERPL-MCNC: 0.69 MG/DL (ref 0.5–1.4)
FASTING STATUS PATIENT QL REPORTED: NORMAL
GLOBULIN SER CALC-MCNC: 2.8 G/DL (ref 1.9–3.5)
GLUCOSE SERPL-MCNC: 81 MG/DL (ref 65–99)
HDLC SERPL-MCNC: 63 MG/DL
LDLC SERPL CALC-MCNC: 89 MG/DL
POTASSIUM SERPL-SCNC: 4.1 MMOL/L (ref 3.6–5.5)
PROT SERPL-MCNC: 7 G/DL (ref 6–8.2)
SODIUM SERPL-SCNC: 140 MMOL/L (ref 135–145)
TRIGL SERPL-MCNC: 44 MG/DL (ref 0–149)

## 2018-09-27 PROCEDURE — 80053 COMPREHEN METABOLIC PANEL: CPT

## 2018-09-27 PROCEDURE — 80061 LIPID PANEL: CPT

## 2018-09-27 PROCEDURE — 36415 COLL VENOUS BLD VENIPUNCTURE: CPT

## 2018-10-06 ENCOUNTER — TELEPHONE (OUTPATIENT)
Dept: MEDICAL GROUP | Facility: PHYSICIAN GROUP | Age: 33
End: 2018-10-06

## 2018-10-06 NOTE — TELEPHONE ENCOUNTER
----- Message from Gwendolyn Hernandez sent at 10/5/2018  5:00 AM PDT -----  Regarding: Mack Snow,  I have reviewed your labs, they all look great!  Samantha

## 2018-10-06 NOTE — TELEPHONE ENCOUNTER
I sent the below info to patient via a [employer] message a week ago--pt still has not read message. Please call pt with message. Thank you.

## 2019-04-03 ENCOUNTER — HOSPITAL ENCOUNTER (OUTPATIENT)
Dept: LAB | Facility: MEDICAL CENTER | Age: 34
End: 2019-04-03
Attending: ANESTHESIOLOGY
Payer: OTHER GOVERNMENT

## 2019-04-03 LAB — HCT VFR BLD AUTO: 41.9 % (ref 37–47)

## 2019-04-03 PROCEDURE — 85014 HEMATOCRIT: CPT

## 2019-04-03 PROCEDURE — 36415 COLL VENOUS BLD VENIPUNCTURE: CPT

## 2020-11-06 ENCOUNTER — HOSPITAL ENCOUNTER (EMERGENCY)
Facility: MEDICAL CENTER | Age: 35
End: 2020-11-07
Attending: EMERGENCY MEDICINE
Payer: OTHER GOVERNMENT

## 2020-11-06 ENCOUNTER — OFFICE VISIT (OUTPATIENT)
Dept: URGENT CARE | Facility: CLINIC | Age: 35
End: 2020-11-06
Payer: OTHER GOVERNMENT

## 2020-11-06 VITALS
DIASTOLIC BLOOD PRESSURE: 60 MMHG | RESPIRATION RATE: 14 BRPM | SYSTOLIC BLOOD PRESSURE: 102 MMHG | OXYGEN SATURATION: 95 % | BODY MASS INDEX: 24.57 KG/M2 | HEIGHT: 67 IN | TEMPERATURE: 98.3 F | WEIGHT: 156.53 LBS | HEART RATE: 84 BPM

## 2020-11-06 DIAGNOSIS — R07.1 INSPIRATORY PAIN: ICD-10-CM

## 2020-11-06 DIAGNOSIS — R11.0 NAUSEA: ICD-10-CM

## 2020-11-06 DIAGNOSIS — R07.9 CHEST PAIN, UNSPECIFIED TYPE: ICD-10-CM

## 2020-11-06 DIAGNOSIS — R10.13 EPIGASTRIC PAIN: ICD-10-CM

## 2020-11-06 DIAGNOSIS — R10.30 LOWER ABDOMINAL PAIN: ICD-10-CM

## 2020-11-06 LAB
ALBUMIN SERPL BCP-MCNC: 4.2 G/DL (ref 3.2–4.9)
ALBUMIN/GLOB SERPL: 1.7 G/DL
ALP SERPL-CCNC: 65 U/L (ref 30–99)
ALT SERPL-CCNC: 20 U/L (ref 2–50)
ANION GAP SERPL CALC-SCNC: 10 MMOL/L (ref 7–16)
APPEARANCE UR: CLEAR
AST SERPL-CCNC: 18 U/L (ref 12–45)
BASOPHILS # BLD AUTO: 0.8 % (ref 0–1.8)
BASOPHILS # BLD: 0.07 K/UL (ref 0–0.12)
BILIRUB SERPL-MCNC: 0.5 MG/DL (ref 0.1–1.5)
BILIRUB UR QL STRIP.AUTO: NEGATIVE
BUN SERPL-MCNC: 9 MG/DL (ref 8–22)
CALCIUM SERPL-MCNC: 9 MG/DL (ref 8.5–10.5)
CHLORIDE SERPL-SCNC: 106 MMOL/L (ref 96–112)
CO2 SERPL-SCNC: 24 MMOL/L (ref 20–33)
COLOR UR: YELLOW
CREAT SERPL-MCNC: 0.62 MG/DL (ref 0.5–1.4)
EKG IMPRESSION: NORMAL
EOSINOPHIL # BLD AUTO: 0.09 K/UL (ref 0–0.51)
EOSINOPHIL NFR BLD: 1 % (ref 0–6.9)
ERYTHROCYTE [DISTWIDTH] IN BLOOD BY AUTOMATED COUNT: 45 FL (ref 35.9–50)
GLOBULIN SER CALC-MCNC: 2.5 G/DL (ref 1.9–3.5)
GLUCOSE SERPL-MCNC: 92 MG/DL (ref 65–99)
GLUCOSE UR STRIP.AUTO-MCNC: NEGATIVE MG/DL
HCT VFR BLD AUTO: 40 % (ref 37–47)
HGB BLD-MCNC: 13 G/DL (ref 12–16)
IMM GRANULOCYTES # BLD AUTO: 0.03 K/UL (ref 0–0.11)
IMM GRANULOCYTES NFR BLD AUTO: 0.3 % (ref 0–0.9)
KETONES UR STRIP.AUTO-MCNC: NEGATIVE MG/DL
LEUKOCYTE ESTERASE UR QL STRIP.AUTO: NEGATIVE
LIPASE SERPL-CCNC: 17 U/L (ref 11–82)
LYMPHOCYTES # BLD AUTO: 2.44 K/UL (ref 1–4.8)
LYMPHOCYTES NFR BLD: 26.2 % (ref 22–41)
MCH RBC QN AUTO: 30.4 PG (ref 27–33)
MCHC RBC AUTO-ENTMCNC: 32.5 G/DL (ref 33.6–35)
MCV RBC AUTO: 93.7 FL (ref 81.4–97.8)
MICRO URNS: NORMAL
MONOCYTES # BLD AUTO: 0.51 K/UL (ref 0–0.85)
MONOCYTES NFR BLD AUTO: 5.5 % (ref 0–13.4)
NEUTROPHILS # BLD AUTO: 6.18 K/UL (ref 2–7.15)
NEUTROPHILS NFR BLD: 66.2 % (ref 44–72)
NITRITE UR QL STRIP.AUTO: NEGATIVE
NRBC # BLD AUTO: 0 K/UL
NRBC BLD-RTO: 0 /100 WBC
PH UR STRIP.AUTO: 7 [PH] (ref 5–8)
PLATELET # BLD AUTO: 337 K/UL (ref 164–446)
PMV BLD AUTO: 11.1 FL (ref 9–12.9)
POTASSIUM SERPL-SCNC: 3.8 MMOL/L (ref 3.6–5.5)
PROT SERPL-MCNC: 6.7 G/DL (ref 6–8.2)
PROT UR QL STRIP: NEGATIVE MG/DL
RBC # BLD AUTO: 4.27 M/UL (ref 4.2–5.4)
RBC UR QL AUTO: NEGATIVE
SODIUM SERPL-SCNC: 140 MMOL/L (ref 135–145)
SP GR UR STRIP.AUTO: 1.02
UROBILINOGEN UR STRIP.AUTO-MCNC: 0.2 MG/DL
WBC # BLD AUTO: 9.3 K/UL (ref 4.8–10.8)

## 2020-11-06 PROCEDURE — 80053 COMPREHEN METABOLIC PANEL: CPT

## 2020-11-06 PROCEDURE — 83690 ASSAY OF LIPASE: CPT

## 2020-11-06 PROCEDURE — 99284 EMERGENCY DEPT VISIT MOD MDM: CPT

## 2020-11-06 PROCEDURE — 36415 COLL VENOUS BLD VENIPUNCTURE: CPT

## 2020-11-06 PROCEDURE — 85025 COMPLETE CBC W/AUTO DIFF WBC: CPT

## 2020-11-06 PROCEDURE — 84484 ASSAY OF TROPONIN QUANT: CPT

## 2020-11-06 PROCEDURE — 81003 URINALYSIS AUTO W/O SCOPE: CPT

## 2020-11-06 PROCEDURE — 93005 ELECTROCARDIOGRAM TRACING: CPT | Performed by: EMERGENCY MEDICINE

## 2020-11-06 PROCEDURE — 99214 OFFICE O/P EST MOD 30 MIN: CPT | Performed by: NURSE PRACTITIONER

## 2020-11-06 PROCEDURE — 93000 ELECTROCARDIOGRAM COMPLETE: CPT | Performed by: NURSE PRACTITIONER

## 2020-11-06 PROCEDURE — 93005 ELECTROCARDIOGRAM TRACING: CPT

## 2020-11-06 RX ORDER — ONDANSETRON 2 MG/ML
4 INJECTION INTRAMUSCULAR; INTRAVENOUS ONCE
Status: COMPLETED | OUTPATIENT
Start: 2020-11-07 | End: 2020-11-07

## 2020-11-06 ASSESSMENT — ENCOUNTER SYMPTOMS
CHILLS: 0
PALPITATIONS: 0
DIARRHEA: 0
NAUSEA: 1
HEADACHES: 0
VOMITING: 0
CHILLS: 0
SHORTNESS OF BREATH: 0
VOMITING: 0
HEMOPTYSIS: 0
NECK PAIN: 0
HEARTBURN: 0
DIAPHORESIS: 0
FEVER: 0
ORTHOPNEA: 0
WHEEZING: 0
COUGH: 0
NAUSEA: 1
ABDOMINAL PAIN: 0
SPUTUM PRODUCTION: 0
ABDOMINAL PAIN: 1
FEVER: 0
BACK PAIN: 0
SHORTNESS OF BREATH: 0
DIZZINESS: 0

## 2020-11-06 ASSESSMENT — PAIN DESCRIPTION - DESCRIPTORS: DESCRIPTORS: ACHING;DULL

## 2020-11-06 NOTE — PROGRESS NOTES
"Subjective:      Gwendolyn Hernandez is a 35 y.o. female who presents with Breathing Problem (Pt states when she inhales she feels a sudden pain at the base of her sternum.)            Gwendolyn comes in today with a complaint of chest and epigastric pain on inspiration.  She notes a sharp pain \"behind the sternum\" at the inferior aspect.  She first noted waxing and waning symptoms 2 days ago.  She denies any known trauma or strain.  Today symptoms worsened and are persistent.  She describes a bandlike pain on deep breathing at the diaphragm level.  She notes mild nausea.  Denies any heartburn, vomiting, fever, chills, myalgias, URI symptoms, cough, shortness of breath, wheezing, or rash.  No history of similar symptoms.  She has not tried any medications to relieve the symptoms.  Medical history unremarkable for cardiovascular disease.  Surgical history significant for hysterectomy and cholecystectomy.  Non-smoker.        Review of Systems   Constitutional: Negative for chills, diaphoresis, fever and malaise/fatigue.   Respiratory: Negative for cough, hemoptysis, sputum production, shortness of breath and wheezing.    Cardiovascular: Positive for chest pain. Negative for palpitations, orthopnea and leg swelling.   Gastrointestinal: Positive for nausea. Negative for abdominal pain, diarrhea, heartburn and vomiting.   Musculoskeletal: Negative for back pain and neck pain.   Skin: Negative for rash.   Neurological: Negative for dizziness and headaches.     Medications, Allergies, and current problem list reviewed today in Epic     Objective:     Blood Pressure 102/60   Pulse 84   Temperature 36.8 °C (98.3 °F) (Temporal)   Respiration 14   Height 1.702 m (5' 7\")   Weight 71 kg (156 lb 8.4 oz)   Last Menstrual Period 04/27/2015   Oxygen Saturation 95%   Body Mass Index 24.52 kg/m²      Physical Exam  Vitals signs reviewed.   Constitutional:       General: She is not in acute distress.     Appearance: Normal appearance. " She is well-developed and normal weight. She is not toxic-appearing or diaphoretic.   HENT:      Head: Normocephalic.      Nose: Nose normal.      Mouth/Throat:      Pharynx: No oropharyngeal exudate.   Eyes:      General: No scleral icterus.        Right eye: No discharge.         Left eye: No discharge.      Conjunctiva/sclera: Conjunctivae normal.      Pupils: Pupils are equal, round, and reactive to light.   Neck:      Musculoskeletal: Neck supple. No neck rigidity.      Thyroid: No thyromegaly.      Vascular: No JVD.      Trachea: No tracheal deviation.   Cardiovascular:      Rate and Rhythm: Normal rate and regular rhythm.      Heart sounds: Normal heart sounds. No murmur. No friction rub. No gallop.    Pulmonary:      Effort: Pulmonary effort is normal. No respiratory distress.      Breath sounds: Normal breath sounds. No stridor. No wheezing, rhonchi or rales.   Chest:      Chest wall: No tenderness.   Abdominal:      General: Abdomen is flat. Bowel sounds are normal. There is no distension.      Palpations: Abdomen is soft.      Tenderness: There is abdominal tenderness in the epigastric area. There is no right CVA tenderness, left CVA tenderness, guarding or rebound. Negative signs include Pacheco's sign, Rovsing's sign and McBurney's sign.   Lymphadenopathy:      Cervical: No cervical adenopathy.   Skin:     General: Skin is warm and dry.      Capillary Refill: Capillary refill takes less than 2 seconds.      Findings: No erythema or rash.      Comments: No shingles type rash or other lesions noted on chest wall.  NO bruising, swelling, or erythema.     Neurological:      Mental Status: She is alert and oriented to person, place, and time.      Motor: No weakness.      Coordination: Coordination normal.      Gait: Gait normal.   Psychiatric:         Mood and Affect: Mood normal.         Behavior: Behavior normal.         Thought Content: Thought content normal.       In clinic medication: GI cocktail.   Patient tolerated well with no relief of symptoms noted.    EKG:  Normal sinus rhythm with no ST elevation or T wave inverstion.  Rate 69.    QRSD 94    QTc 422  P axis 58  QRS axis 68  T axis 51            Assessment/Plan:        1. Chest pain, unspecified type  EKG - Clinic Performed   2. Epigastric pain  GI cocktail   3. Inspiratory pain  EKG - Clinic Performed     Discussed exam findings with Gwendolyn.  Etiology unclear.  Differential reviewed including GI, cardiovascular, pulmonary, musculoskeletal or other unknown.  Advised no x-ray or other advanced diagnostics available in this clinic.  Discussed continued outpatient evaluation versus ED care.  At this time, ED evaluation and care is recommended due to access to high level diagnostics and care and broad unknown etiology.  She verbalized understanding of and agreed with plan of care.  She will travel via private car.

## 2020-11-07 ENCOUNTER — APPOINTMENT (OUTPATIENT)
Dept: RADIOLOGY | Facility: MEDICAL CENTER | Age: 35
End: 2020-11-07
Attending: EMERGENCY MEDICINE
Payer: OTHER GOVERNMENT

## 2020-11-07 VITALS
HEIGHT: 67 IN | BODY MASS INDEX: 24.6 KG/M2 | TEMPERATURE: 98.2 F | OXYGEN SATURATION: 96 % | DIASTOLIC BLOOD PRESSURE: 62 MMHG | HEART RATE: 65 BPM | SYSTOLIC BLOOD PRESSURE: 103 MMHG | WEIGHT: 156.75 LBS | RESPIRATION RATE: 14 BRPM

## 2020-11-07 LAB — TROPONIN T SERPL-MCNC: <6 NG/L (ref 6–19)

## 2020-11-07 PROCEDURE — 700111 HCHG RX REV CODE 636 W/ 250 OVERRIDE (IP): Performed by: EMERGENCY MEDICINE

## 2020-11-07 PROCEDURE — 71045 X-RAY EXAM CHEST 1 VIEW: CPT

## 2020-11-07 PROCEDURE — 96375 TX/PRO/DX INJ NEW DRUG ADDON: CPT

## 2020-11-07 PROCEDURE — 700117 HCHG RX CONTRAST REV CODE 255: Performed by: EMERGENCY MEDICINE

## 2020-11-07 PROCEDURE — 74177 CT ABD & PELVIS W/CONTRAST: CPT

## 2020-11-07 PROCEDURE — 96374 THER/PROPH/DIAG INJ IV PUSH: CPT

## 2020-11-07 RX ORDER — ONDANSETRON 4 MG/1
4 TABLET, ORALLY DISINTEGRATING ORAL EVERY 6 HOURS PRN
Qty: 10 TAB | Refills: 0 | Status: SHIPPED | OUTPATIENT
Start: 2020-11-07 | End: 2021-08-11

## 2020-11-07 RX ORDER — NAPROXEN 500 MG/1
500 TABLET ORAL
Qty: 30 TAB | Refills: 0 | Status: SHIPPED | OUTPATIENT
Start: 2020-11-07 | End: 2021-08-11

## 2020-11-07 RX ADMIN — IOHEXOL 80 ML: 350 INJECTION, SOLUTION INTRAVENOUS at 01:51

## 2020-11-07 RX ADMIN — ONDANSETRON 4 MG: 2 INJECTION INTRAMUSCULAR; INTRAVENOUS at 00:35

## 2020-11-07 RX ADMIN — FENTANYL CITRATE 50 MCG: 50 INJECTION, SOLUTION INTRAMUSCULAR; INTRAVENOUS at 00:35

## 2020-11-07 NOTE — ED TRIAGE NOTES
Gwendolyn Hernandez  35 y.o.  female  Chief Complaint   Patient presents with   • Abdominal Pain     c/o lower abdominal pain contant dull ache since yesterday. pain radiates to lower back and epigastric area. + nausea denies vomiting. seen at Urgent care today and was told to come to ER.

## 2020-11-07 NOTE — ED PROVIDER NOTES
ED Provider Note    Scribed for Anna Pacheco M.D. by Regulo Felipe. 11/6/2020, 11:42 PM.    Primary care provider: [facility] Gaona  Means of arrival: Walk-in  History obtained from: Patient  History limited by: None    CHIEF COMPLAINT  Chief Complaint   Patient presents with   • Abdominal Pain     c/o lower abdominal pain contant dull ache since yesterday. pain radiates to lower back and epigastric area. + nausea denies vomiting. seen at Urgent care today and was told to come to ER.        HPI  Gwendolyn Hernandez is a 35 y.o. female who presents to the Emergency Department for acute, waxing and waning lower abdominal pain onset 2 days ago. Patient describes the pain as a dull ache which radiates up to her epigastric region and into her chest. Her pain is slightly exacerbated with deep breathes.  She is initially evaluated at urgent care and sent here for further evaluation.  She reports being nauseous but denies any vomiting, fever, chills, or shortness of breath.  Has no known history of cardiac disease.  No history of pulmonary embolus or DVT.  Has a history of PCOS status post hysterectomy.  Patient is also had a cholecystectomy in the past.    EKG from earlier today at  was reviewed and was unremarkable.     REVIEW OF SYSTEMS  Review of Systems   Constitutional: Negative for chills and fever.   Respiratory: Negative for shortness of breath.    Cardiovascular: Positive for chest pain.   Gastrointestinal: Positive for abdominal pain and nausea. Negative for vomiting.   All other systems reviewed and are negative.    PAST MEDICAL HISTORY   has a past medical history of Anemia, Arthritis, Black-out (not amnesia), Gynecological disorder, and Infectious disease.    SURGICAL HISTORY   has a past surgical history that includes cholecystectomy; other (4-1-2016 teeth extraction); other (3-2016); and hysterectomy robotic (Bilateral, 5/6/2016).    SOCIAL HISTORY  Social History     Tobacco Use   • Smoking  "status: Never Smoker   • Smokeless tobacco: Never Used   Substance Use Topics   • Alcohol use: No     Alcohol/week: 0.0 oz   • Drug use: No      Social History     Substance and Sexual Activity   Drug Use No       FAMILY HISTORY  Family History   Problem Relation Age of Onset   • Genitourinary () Problems Mother    • Cancer Father        CURRENT MEDICATIONS  Current Outpatient Medications   Medication Instructions   • GI cocktail 50 mL, Oral, ONCE, Shake well, room temperature   • naproxen (NAPROSYN) 500 mg, Oral, 2 TIMES DAILY WITH MEALS   • valACYclovir (VALTREX) 500 MG Tab Take twice a day for 3-5 days during outbreak        ALLERGIES  Allergies   Allergen Reactions   • Amoxicillin Hives   • Norco [Apap-Fd&C Yellow #10 Al Jenkins-Hydrocodone] Unspecified     Severe headache   • Percocet [Apap-Fd&C Red #40 Al Jenkins-Oxycodone] Unspecified     Possible migraine   • Trazodone Shortness of Breath       PHYSICAL EXAM  VITAL SIGNS: /59   Pulse 79   Temp 36.7 °C (98.1 °F) (Temporal)   Resp 14   Ht 1.702 m (5' 7\")   Wt 71.1 kg (156 lb 12 oz)   LMP 04/27/2015   SpO2 97%   BMI 24.55 kg/m²   Vitals reviewed by myself.  Physical Exam  Nursing note and vitals reviewed.  Constitutional: Well-developed and well-nourished.   HENT: Head is normocephalic and atraumatic.  Eyes: extra-ocular movements intact  Cardiovascular: Regular rate and regular rhythm. No murmur heard.  Pulmonary/Chest: Breath sounds normal. No wheezes or rales.   Abdominal: Bilateral lower quadrant tenderness to palpation, no rebound, no guarding, nonperitoneal. Soft. No distention.    Musculoskeletal: Extremities exhibit normal range of motion without edema or tenderness.   Neurological: Awake and alert  Skin: Skin is warm and dry. No rash.       DIAGNOSTIC STUDIES  LABS  Labs Reviewed   CBC WITH DIFFERENTIAL - Abnormal; Notable for the following components:       Result Value    MCHC 32.5 (*)     All other components within normal limits   COMP " METABOLIC PANEL   LIPASE   URINALYSIS,CULTURE IF INDICATED    Narrative:     Indication for culture:->Unexplained new onset of Flank pain  and/or Costovertebral angle tenderness   ESTIMATED GFR   TROPONIN     All labs reviewed by me.    RADIOLOGY  CT-ABDOMEN-PELVIS WITH   Final Result         1.  No acute abnormality.   2.  Hepatomegaly      DX-CHEST-PORTABLE (1 VIEW)   Final Result         1.  No acute cardiopulmonary disease.        The radiologist's interpretation of all radiological studies have been reviewed by me.    REASSESSMENT    11:42 PM - Patient seen and examined at bedside. Discussed plan of care, including labs/imaging and treating her symptoms with medications. Patient agrees to the plan of care.      1:42 AM - Patient reports feeling improved prior to being escorted to CT.     2:22 AM - Patient was reevaluated at bedside. Discussed lab and radiology results with the patient and informed them they are reassuring. Return precautions were discussed with the patient, and they were cleared for discharge at this time. Patient was understanding and agreeable to discharge.     COURSE & MEDICAL DECISION MAKING  Nursing notes, VS, PMSFHx reviewed in chart.    Patient is a 35-year-old female who comes in for evaluation of abdominal pain radiating to her chest.  Differential diagnosis includes ovarian cyst, viral syndrome, colitis, diverticulitis, appendicitis.  Diagnostic work-up includes labs and CT of the abdomen.  Given her pain radiating to the chest I reviewed EKG from urgent care which was unremarkable.  Will obtain a screening troponin and chest x-ray as well.  Patient is PERC negative making PE unlikely.    Patient's initial vitals are within normal limits, she is well-appearing on exam.  She is treated with fentanyl after which she feels greatly improved.  Labs returned and are unremarkable.  Troponin is normal.  Heart score is 0 making acute coronary syndrome unlikely.  Urinalysis demonstrates no  signs of infection.  Chest x-ray demonstrates no acute cardiopulmonary processes.  CT of the abdomen demonstrates no acute findings, mild hepatomegaly.  Therefore patient is reassured and advised on symptomatic management of likely viral illness.  She is given strict return precautions and discharged in stable condition.    The patient will return for new or worsening symptoms and is stable at the time of discharge.    The patient is referred to a primary physician for blood pressure management, diabetic screening, and for all other preventative health concerns.    DISPOSITION:  Patient will be discharged home in stable condition.    FOLLOW UP:  Samantha Vera, LANE.P.R.N.  [address] [place] [address]  [phone]            OUTPATIENT MEDICATIONS:  Discharge Medication List as of 11/7/2020  2:33 AM      START taking these medications    Details   ondansetron (ZOFRAN ODT) 4 MG TABLET DISPERSIBLE Take 1 Tab by mouth every 6 hours as needed for Nausea., Disp-10 Tab, R-0, Print Rx Paper               FINAL IMPRESSION  1. Lower abdominal pain    2. Nausea          Regulo GONZÁLES (Cheri), am scribing for, and in the presence of, Anna Pacheco M.D..    Electronically signed by: Regulo Felipe (Cheri), 11/6/2020    Anna GONZÁLES M.D. personally performed the services described in this documentation, as scribed by Regulo Felipe in my presence, and it is both accurate and complete. C.    The note accurately reflects work and decisions made by me.  Anna Pacheco M.D.  11/7/2020  4:49 AM

## 2020-11-07 NOTE — ED NOTES
Pt rounded on by RN. Pt reports pain has slightly improved in relation to initial triage, now rated 4/10 in lower abdomen. Vitals obtained and charted. Blood drawn and tubed to lab. Pt placed back in lobby, educated to notify staff of changes.

## 2021-01-08 ENCOUNTER — HOSPITAL ENCOUNTER (OUTPATIENT)
Facility: MEDICAL CENTER | Age: 36
End: 2021-01-08
Attending: PHYSICIAN ASSISTANT
Payer: OTHER GOVERNMENT

## 2021-01-08 ENCOUNTER — OFFICE VISIT (OUTPATIENT)
Dept: URGENT CARE | Facility: PHYSICIAN GROUP | Age: 36
End: 2021-01-08
Payer: OTHER GOVERNMENT

## 2021-01-08 VITALS
RESPIRATION RATE: 16 BRPM | HEART RATE: 85 BPM | DIASTOLIC BLOOD PRESSURE: 72 MMHG | OXYGEN SATURATION: 97 % | HEIGHT: 67 IN | TEMPERATURE: 98.2 F | BODY MASS INDEX: 25.9 KG/M2 | WEIGHT: 165 LBS | SYSTOLIC BLOOD PRESSURE: 108 MMHG

## 2021-01-08 DIAGNOSIS — Z20.822 CLOSE EXPOSURE TO COVID-19 VIRUS: ICD-10-CM

## 2021-01-08 DIAGNOSIS — R43.2 LOSS OF TASTE: ICD-10-CM

## 2021-01-08 LAB — COVID ORDER STATUS COVID19: NORMAL

## 2021-01-08 PROCEDURE — U0005 INFEC AGEN DETEC AMPLI PROBE: HCPCS

## 2021-01-08 PROCEDURE — U0003 INFECTIOUS AGENT DETECTION BY NUCLEIC ACID (DNA OR RNA); SEVERE ACUTE RESPIRATORY SYNDROME CORONAVIRUS 2 (SARS-COV-2) (CORONAVIRUS DISEASE [COVID-19]), AMPLIFIED PROBE TECHNIQUE, MAKING USE OF HIGH THROUGHPUT TECHNOLOGIES AS DESCRIBED BY CMS-2020-01-R: HCPCS

## 2021-01-08 PROCEDURE — 99213 OFFICE O/P EST LOW 20 MIN: CPT | Mod: CS | Performed by: PHYSICIAN ASSISTANT

## 2021-01-08 RX ORDER — METRONIDAZOLE 500 MG/1
TABLET ORAL
COMMUNITY
Start: 2020-11-10 | End: 2023-02-06

## 2021-01-08 RX ORDER — IBUPROFEN 800 MG/1
TABLET ORAL
COMMUNITY
Start: 2020-11-10 | End: 2023-07-26

## 2021-01-08 RX ORDER — VALACYCLOVIR HYDROCHLORIDE 1 G/1
TABLET, FILM COATED ORAL
COMMUNITY
Start: 2020-11-03 | End: 2021-10-29

## 2021-01-08 RX ORDER — VALACYCLOVIR HYDROCHLORIDE 1 G/1
TABLET, FILM COATED ORAL
COMMUNITY
Start: 2021-01-05 | End: 2023-02-06 | Stop reason: SDUPTHER

## 2021-01-08 ASSESSMENT — ENCOUNTER SYMPTOMS
COUGH: 1
HEADACHES: 1
SPUTUM PRODUCTION: 0
DIARRHEA: 1
FEVER: 0
CHILLS: 1
VOMITING: 0
ABDOMINAL PAIN: 0
NAUSEA: 0
SHORTNESS OF BREATH: 0
WHEEZING: 0
SORE THROAT: 0
MYALGIAS: 1

## 2021-01-08 ASSESSMENT — FIBROSIS 4 INDEX: FIB4 SCORE: 0.43

## 2021-01-08 NOTE — PROGRESS NOTES
"Subjective:   Gwendolyn Hernandez  is a 35 y.o. female who presents for Coronavirus Screening (headache x2-3 days, sore throat, runny nose, lighheaded, loss of taste. positive exposure last month. )      HPI    Patient comes to clinic complaining of headache times last 2 to 3 days and loose stool.  She awoke this morning with loss of taste and became concerned for possible Covid exposure.  She denies fevers but has noted chills and body aches.  She notes mild cough without production or wheezing.  She denies sore throat or ear pain.  She denies history of asthma bronchitis and thinks she may have had childhood pneumonia.  Denies nausea vomiting or abdominal pain.  She denies loss of smell.  She is unclear closure but has been around a number of people known to be positive for Covid.  She denies treatments tried.  She declines treatment medications today.    Review of Systems   Constitutional: Positive for chills. Negative for fever.   HENT: Positive for congestion. Negative for ear pain and sore throat.    Respiratory: Positive for cough. Negative for sputum production, shortness of breath and wheezing.    Gastrointestinal: Positive for diarrhea. Negative for abdominal pain, nausea and vomiting.   Musculoskeletal: Positive for myalgias.   Skin: Negative for rash.   Neurological: Positive for headaches.       Allergies   Allergen Reactions   • Amoxicillin Hives   • Norco [Apap-Fd&C Yellow #10 Al Jenkins-Hydrocodone] Unspecified     Severe headache   • Percocet [Apap-Fd&C Red #40 Al Jenkins-Oxycodone] Unspecified     Possible migraine   • Trazodone Shortness of Breath        Objective:   /72   Pulse 85   Temp 36.8 °C (98.2 °F) (Temporal)   Resp 16   Ht 1.702 m (5' 7\")   Wt 74.8 kg (165 lb)   LMP 04/27/2015   SpO2 97%   BMI 25.84 kg/m²     Physical Exam  Vitals signs and nursing note reviewed.   Constitutional:       General: She is not in acute distress.     Appearance: She is well-developed. She is not diaphoretic. "   HENT:      Head: Normocephalic and atraumatic.      Right Ear: Tympanic membrane, ear canal and external ear normal.      Left Ear: Tympanic membrane, ear canal and external ear normal.      Nose: Congestion present.      Mouth/Throat:      Pharynx: Uvula midline. Posterior oropharyngeal erythema ( mild PND) present. No oropharyngeal exudate.      Tonsils: No tonsillar abscesses.   Eyes:      General: Lids are normal. No scleral icterus.        Right eye: No discharge.         Left eye: No discharge.      Conjunctiva/sclera: Conjunctivae normal.   Neck:      Musculoskeletal: Neck supple.   Pulmonary:      Effort: Pulmonary effort is normal. No respiratory distress.      Breath sounds: No decreased breath sounds, wheezing, rhonchi or rales.   Musculoskeletal: Normal range of motion.   Lymphadenopathy:      Cervical: Cervical adenopathy ( mild bilat) present.   Skin:     General: Skin is warm and dry.      Coloration: Skin is not pale.      Findings: No erythema.   Neurological:      Mental Status: She is alert and oriented to person, place, and time. She is not disoriented.   Psychiatric:         Speech: Speech normal.         Behavior: Behavior normal.     COVID pending    Assessment/Plan:   1. Close exposure to COVID-19 virus  - COVID/SARS CoV-2 PCR; Future    2. Loss of taste    Other orders  - ibuprofen (MOTRIN) 800 MG Tab; TAKE 1 TABLET BY MOUTH THREE TIMES DAILY WITH FOOD OR MILK AS NEEDED FOR PAIN FOR 10 DAYS  - metroNIDAZOLE (FLAGYL) 500 MG Tab; TAKE 1 TABLET BY MOUTH EVERY 12 HOURS FOR 7 DAYS  - valacyclovir (VALTREX) 1 GM Tab; Take  by mouth.  - valacyclovir (VALTREX) 1 GM Tab  Supportive care is reviewed with patient/caregiver - recommend to push PO fluids and electrolytes, over-the-counter symptom support medications reviewed, ER precautions with worsened symptoms, quarantine recommendations reviewed, sent with letter, MyChart for results of testing  Return to clinic with lack of resolution or  progression of symptoms.  ER precautions with any worsening symptoms are reviewed with patient/caregiver and they do express understanding    I have worn an N95 mask, gloves and eye protection for the entire encounter with this patient.     Differential diagnosis, natural history, supportive care, and indications for immediate follow-up discussed.

## 2021-01-09 LAB
SARS-COV-2 RNA RESP QL NAA+PROBE: NOTDETECTED
SPECIMEN SOURCE: NORMAL

## 2021-08-11 ENCOUNTER — OFFICE VISIT (OUTPATIENT)
Dept: URGENT CARE | Facility: PHYSICIAN GROUP | Age: 36
End: 2021-08-11
Payer: COMMERCIAL

## 2021-08-11 ENCOUNTER — HOSPITAL ENCOUNTER (OUTPATIENT)
Facility: MEDICAL CENTER | Age: 36
End: 2021-08-11
Attending: FAMILY MEDICINE
Payer: COMMERCIAL

## 2021-08-11 VITALS
OXYGEN SATURATION: 98 % | HEIGHT: 66 IN | TEMPERATURE: 98.1 F | DIASTOLIC BLOOD PRESSURE: 64 MMHG | WEIGHT: 153 LBS | BODY MASS INDEX: 24.59 KG/M2 | SYSTOLIC BLOOD PRESSURE: 102 MMHG | HEART RATE: 98 BPM | RESPIRATION RATE: 16 BRPM

## 2021-08-11 DIAGNOSIS — J32.9 RHINOSINUSITIS: ICD-10-CM

## 2021-08-11 PROBLEM — G43.909 MIGRAINE HEADACHE: Status: ACTIVE | Noted: 2021-08-11

## 2021-08-11 PROBLEM — E28.2 POLYCYSTIC OVARIES: Status: ACTIVE | Noted: 2021-08-11

## 2021-08-11 PROBLEM — E55.9 VITAMIN D DEFICIENCY: Status: ACTIVE | Noted: 2021-08-11

## 2021-08-11 PROBLEM — Z86.79 HISTORY OF CARDIOVASCULAR DISORDER: Status: ACTIVE | Noted: 2021-08-11

## 2021-08-11 PROCEDURE — 99213 OFFICE O/P EST LOW 20 MIN: CPT | Performed by: FAMILY MEDICINE

## 2021-08-11 PROCEDURE — U0003 INFECTIOUS AGENT DETECTION BY NUCLEIC ACID (DNA OR RNA); SEVERE ACUTE RESPIRATORY SYNDROME CORONAVIRUS 2 (SARS-COV-2) (CORONAVIRUS DISEASE [COVID-19]), AMPLIFIED PROBE TECHNIQUE, MAKING USE OF HIGH THROUGHPUT TECHNOLOGIES AS DESCRIBED BY CMS-2020-01-R: HCPCS

## 2021-08-11 PROCEDURE — U0005 INFEC AGEN DETEC AMPLI PROBE: HCPCS

## 2021-08-11 RX ORDER — DOXYCYCLINE HYCLATE 100 MG
100 TABLET ORAL 2 TIMES DAILY
Qty: 14 TABLET | Refills: 0 | Status: SHIPPED | OUTPATIENT
Start: 2021-08-11 | End: 2021-08-18

## 2021-08-11 ASSESSMENT — ENCOUNTER SYMPTOMS
MYALGIAS: 1
NAUSEA: 0
VOMITING: 0
EYE DISCHARGE: 0
WEIGHT LOSS: 0
EYE REDNESS: 0

## 2021-08-11 ASSESSMENT — FIBROSIS 4 INDEX: FIB4 SCORE: 0.44

## 2021-08-11 NOTE — PROGRESS NOTES
"Subjective:      Gwendolyn Hernandez is a 36 y.o. female who presents with Sinus Problem (Facial pressure, sore throat, x2days )            2 days sinus pressure and drainage.  Associated sore throat.  No cough.  No fever.  PMH sinusitis.  No sinus surgeries.  Minimal relief with OTC medications.  No loss of taste or smell.  No known COVID-19 exposures.  Not COVID-19 vaccinated.  No other aggravating or alleviating factors.      Review of Systems   Constitutional: Negative for malaise/fatigue and weight loss.   Eyes: Negative for discharge and redness.   Gastrointestinal: Negative for nausea and vomiting.   Musculoskeletal: Positive for myalgias. Negative for joint pain.   Skin: Negative for itching and rash.          Objective:     /64   Pulse 98   Temp 36.7 °C (98.1 °F) (Temporal)   Resp 16   Ht 1.676 m (5' 6\")   Wt 69.4 kg (153 lb)   LMP 04/27/2015   SpO2 98%   BMI 24.69 kg/m²      Physical Exam  Constitutional:       General: She is not in acute distress.     Appearance: She is well-developed.   HENT:      Head: Normocephalic and atraumatic.      Right Ear: Tympanic membrane normal.      Left Ear: Tympanic membrane normal.      Nose: Congestion present.      Comments: PND  Eyes:      Conjunctiva/sclera: Conjunctivae normal.   Cardiovascular:      Rate and Rhythm: Normal rate and regular rhythm.      Heart sounds: Normal heart sounds. No murmur heard.     Pulmonary:      Effort: Pulmonary effort is normal.      Breath sounds: Normal breath sounds. No wheezing.   Skin:     General: Skin is warm and dry.      Findings: No rash.   Neurological:      Mental Status: She is alert and oriented to person, place, and time.                        Assessment/Plan:     1. Rhinosinusitis  doxycycline (VIBRAMYCIN) 100 MG Tab    COVID/SARS CoV-2 PCR     Differential diagnosis, natural history, supportive care, and indications for immediate follow-up discussed at length.     Nasal saline, decongestant, nasal " corticosteroid    Contingent antibiotic prescription given to patient to fill upon meeting criteria of guidelines discussed.     Self isolate per cdc protocol, f/u c19 testing

## 2021-08-12 DIAGNOSIS — J32.9 RHINOSINUSITIS: ICD-10-CM

## 2021-08-12 LAB
COVID ORDER STATUS COVID19: NORMAL
SARS-COV-2 RNA RESP QL NAA+PROBE: NOTDETECTED
SPECIMEN SOURCE: NORMAL

## 2022-05-24 NOTE — TELEPHONE ENCOUNTER
----- Message from Lupe Mayes P.A.-C. sent at 8/19/2018  9:02 AM PDT -----  Please let patient know her gonorrhea, chlamydia, trichomonas, bacterial vaginosis, and yeast testing came back negative.  Follow-up for worsening or persistent symptoms.   Deborah is a 35 year old year old female here for an OB check.      She is      .  Gestational Age: 23w2d.     Education Provided: Gestational age appropriate education provided     Interim Prenatal ROS (entered by Cha Jackson MD, [facility])   She reports fetal movement.    She denies bleeding.  She denies cramping.  She denies appetite problems    Interim Concerns:  Other concerns/New complaints: None     Problem List- Below problem list was reviewed and updated at todays visit.  Pregnancy  Problems (from 22 to present)     Problem Noted Resolved    10 weeks gestation of pregnancy 3/2/2022 by Madelin Garcia MD No    Priority:  Low      Overview Addendum 2022 11:47 AM by Cha Jackson MD     1st Trimester  3/2/22 O-, ABS+ anti D, rhogam given 22  Dated by 8+3wk US NOT c/w LMP. Subchorionic hemorrhage   (x) Practice dynamics d/w pt  (x) Willing to accept blood txn  (x) Education packet packet  Fhx mom DM: nl early glucola  RNI ( )MMR PP    2nd Trimester  Nl AFP  (x) Anatomy: normal, normal CL   (x) Education packet    3rd Trimester  () 3T labs + ABS  ( )rhogam   () GBBS  () Delivery plan  () Contraception  () Breast pump referral   () Education packet  () Labor precaution    AMA:  Nl NIPT  () ANT  () Serial growth US  () Baby ASA 12-34wks    Vaccines:  (x) Covid booster  (x) Flu  () Tdap           Previous Version            Visit activity: Reviewed care to date. Problem list reviewed and updated  Plan: 36yo  at 23+ weeks   Labor/FKC instructions reviewed   Prenatals: 3T labs with ABS ordered at 27+ weeks   Ultrasound: anatomy reviewed and normal   RTC 4 weeks          RH STATUS: No results found for: ABR]

## 2023-02-06 ENCOUNTER — OFFICE VISIT (OUTPATIENT)
Dept: INTERNAL MEDICINE | Facility: OTHER | Age: 38
End: 2023-02-06
Payer: MEDICAID

## 2023-02-06 ENCOUNTER — TELEPHONE (OUTPATIENT)
Dept: INTERNAL MEDICINE | Facility: OTHER | Age: 38
End: 2023-02-06

## 2023-02-06 VITALS
SYSTOLIC BLOOD PRESSURE: 95 MMHG | DIASTOLIC BLOOD PRESSURE: 58 MMHG | HEART RATE: 91 BPM | TEMPERATURE: 98.3 F | BODY MASS INDEX: 26.36 KG/M2 | HEIGHT: 65 IN | WEIGHT: 158.2 LBS | OXYGEN SATURATION: 98 %

## 2023-02-06 DIAGNOSIS — Z11.3 SCREENING FOR STD (SEXUALLY TRANSMITTED DISEASE): ICD-10-CM

## 2023-02-06 DIAGNOSIS — R41.840 IMPAIRED CONCENTRATION: ICD-10-CM

## 2023-02-06 DIAGNOSIS — A60.00 RECURRENT GENITAL HERPES: ICD-10-CM

## 2023-02-06 DIAGNOSIS — Z86.39 HISTORY OF VITAMIN D DEFICIENCY: ICD-10-CM

## 2023-02-06 DIAGNOSIS — Z86.2 HISTORY OF ANEMIA: ICD-10-CM

## 2023-02-06 DIAGNOSIS — Z13.220 SCREENING FOR LIPID DISORDERS: ICD-10-CM

## 2023-02-06 DIAGNOSIS — Z13.228 SCREENING FOR METABOLIC DISORDER: ICD-10-CM

## 2023-02-06 PROBLEM — Z79.891 CHRONIC USE OF OPIATE DRUG FOR THERAPEUTIC PURPOSE: Status: RESOLVED | Noted: 2018-02-02 | Resolved: 2023-02-06

## 2023-02-06 PROBLEM — G43.909 MIGRAINE HEADACHE: Status: RESOLVED | Noted: 2021-08-11 | Resolved: 2023-02-06

## 2023-02-06 PROCEDURE — 99204 OFFICE O/P NEW MOD 45 MIN: CPT | Mod: GC

## 2023-02-06 RX ORDER — VALACYCLOVIR HYDROCHLORIDE 500 MG/1
500 TABLET, FILM COATED ORAL DAILY
Qty: 90 TABLET | Refills: 1 | Status: SHIPPED | OUTPATIENT
Start: 2023-02-06 | End: 2023-07-26

## 2023-02-06 ASSESSMENT — PATIENT HEALTH QUESTIONNAIRE - PHQ9: CLINICAL INTERPRETATION OF PHQ2 SCORE: 0
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
## 2023-02-07 PROBLEM — R41.840 IMPAIRED CONCENTRATION: Status: ACTIVE | Noted: 2023-02-07

## 2023-02-07 PROBLEM — Z86.39 HISTORY OF VITAMIN D DEFICIENCY: Status: ACTIVE | Noted: 2023-02-07

## 2023-02-07 PROBLEM — Z86.2 HISTORY OF ANEMIA: Status: ACTIVE | Noted: 2023-02-07

## 2023-02-07 PROBLEM — A60.00 RECURRENT GENITAL HERPES: Status: ACTIVE | Noted: 2023-02-07

## 2023-02-07 PROBLEM — E55.9 VITAMIN D DEFICIENCY: Status: RESOLVED | Noted: 2021-08-11 | Resolved: 2023-02-07

## 2023-02-07 ASSESSMENT — ENCOUNTER SYMPTOMS
HALLUCINATIONS: 0
NERVOUS/ANXIOUS: 0
EYES NEGATIVE: 1
MEMORY LOSS: 0
RESPIRATORY NEGATIVE: 1
GASTROINTESTINAL NEGATIVE: 1
NEUROLOGICAL NEGATIVE: 1
CHILLS: 0
FEVER: 0
MUSCULOSKELETAL NEGATIVE: 1
CARDIOVASCULAR NEGATIVE: 1

## 2023-02-07 ASSESSMENT — LIFESTYLE VARIABLES: SUBSTANCE_ABUSE: 0

## 2023-03-16 ENCOUNTER — OFFICE VISIT (OUTPATIENT)
Dept: INTERNAL MEDICINE | Facility: OTHER | Age: 38
End: 2023-03-16
Payer: MEDICAID

## 2023-03-16 VITALS
SYSTOLIC BLOOD PRESSURE: 95 MMHG | HEART RATE: 90 BPM | HEIGHT: 66 IN | OXYGEN SATURATION: 97 % | WEIGHT: 161.6 LBS | BODY MASS INDEX: 25.97 KG/M2 | DIASTOLIC BLOOD PRESSURE: 62 MMHG | TEMPERATURE: 97.5 F

## 2023-03-16 DIAGNOSIS — A60.00 RECURRENT GENITAL HERPES: ICD-10-CM

## 2023-03-16 DIAGNOSIS — L98.9 SKIN LESIONS: ICD-10-CM

## 2023-03-16 DIAGNOSIS — Z11.3 SCREENING FOR STD (SEXUALLY TRANSMITTED DISEASE): ICD-10-CM

## 2023-03-16 DIAGNOSIS — R41.840 IMPAIRED CONCENTRATION: ICD-10-CM

## 2023-03-16 PROCEDURE — 99214 OFFICE O/P EST MOD 30 MIN: CPT | Mod: GE

## 2023-03-16 RX ORDER — BUPROPION HYDROCHLORIDE 100 MG/1
100 TABLET, EXTENDED RELEASE ORAL DAILY
COMMUNITY
Start: 2023-03-06 | End: 2023-07-26

## 2023-03-16 RX ORDER — VALACYCLOVIR HYDROCHLORIDE 1 G/1
TABLET, FILM COATED ORAL
Qty: 30 TABLET | Refills: 1 | Status: SHIPPED | OUTPATIENT
Start: 2023-03-16 | End: 2023-09-28
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
## 2023-03-16 NOTE — PROGRESS NOTES
Teaching Physician Attestation      Level of Participation    I discussed with the resident physician the patient's history, exam, assessment and plan in detail.  Topics listed in my addendum were the focus of the visit.  Healthcare maintenance was not addressed this visit unless listed as a topic in my addendum.  I agree with plan as written along with the following additions/modifications:      Contact/irritant dermatitis vs acne on neck  -? Oil or shampoo from boyfriend or son's hair, denies any other new contacts are scarfs.  -monitor for triggers, trial aquafor, f/u next visit    Genital herpes  -continue 500mg valacyclovir daily suppressive therapy, can take 1g daily for 5 days if has a flare.    STI screening  -hiv and rpr neg.  No discharge, screen for gc/chlam with vaginal swab, counseled on condom use      Low bp  -asymptomatic, reports always low, encourage hydration.    Rtc 6 weeks.

## 2023-03-17 ASSESSMENT — ENCOUNTER SYMPTOMS
FOCAL WEAKNESS: 0
CHILLS: 0
SENSORY CHANGE: 0
VOMITING: 0
COUGH: 0
HEADACHES: 0
FEVER: 0
NAUSEA: 0
PALPITATIONS: 0
SHORTNESS OF BREATH: 0
MYALGIAS: 0
SPEECH CHANGE: 0
EYES NEGATIVE: 1
ABDOMINAL PAIN: 0

## 2023-03-20 DIAGNOSIS — Z11.3 SCREENING FOR STD (SEXUALLY TRANSMITTED DISEASE): ICD-10-CM

## 2023-07-26 ENCOUNTER — HOSPITAL ENCOUNTER (OUTPATIENT)
Facility: MEDICAL CENTER | Age: 38
End: 2023-07-26
Attending: PHYSICIAN ASSISTANT
Payer: MEDICAID

## 2023-07-26 ENCOUNTER — OFFICE VISIT (OUTPATIENT)
Dept: URGENT CARE | Facility: PHYSICIAN GROUP | Age: 38
End: 2023-07-26
Payer: MEDICAID

## 2023-07-26 ENCOUNTER — TELEPHONE (OUTPATIENT)
Dept: INTERNAL MEDICINE | Facility: OTHER | Age: 38
End: 2023-07-26

## 2023-07-26 VITALS
DIASTOLIC BLOOD PRESSURE: 80 MMHG | SYSTOLIC BLOOD PRESSURE: 124 MMHG | TEMPERATURE: 98.2 F | RESPIRATION RATE: 16 BRPM | WEIGHT: 158 LBS | HEART RATE: 76 BPM | OXYGEN SATURATION: 98 % | BODY MASS INDEX: 25.39 KG/M2 | HEIGHT: 66 IN

## 2023-07-26 DIAGNOSIS — N94.9 VAGINAL DISCOMFORT: ICD-10-CM

## 2023-07-26 DIAGNOSIS — A60.00 RECURRENT GENITAL HERPES: ICD-10-CM

## 2023-07-26 LAB
APPEARANCE UR: NORMAL
BILIRUB UR STRIP-MCNC: NORMAL MG/DL
COLOR UR AUTO: YELLOW
GLUCOSE UR STRIP.AUTO-MCNC: NORMAL MG/DL
KETONES UR STRIP.AUTO-MCNC: NORMAL MG/DL
LEUKOCYTE ESTERASE UR QL STRIP.AUTO: NORMAL
NITRITE UR QL STRIP.AUTO: NORMAL
PH UR STRIP.AUTO: 6 [PH] (ref 5–8)
POCT INT CON NEG: NEGATIVE
POCT INT CON POS: POSITIVE
POCT URINE PREGNANCY TEST: NEGATIVE
PROT UR QL STRIP: NORMAL MG/DL
RBC UR QL AUTO: NORMAL
SP GR UR STRIP.AUTO: 1.02
UROBILINOGEN UR STRIP-MCNC: 0.2 MG/DL

## 2023-07-26 PROCEDURE — 87480 CANDIDA DNA DIR PROBE: CPT

## 2023-07-26 PROCEDURE — 3074F SYST BP LT 130 MM HG: CPT | Performed by: PHYSICIAN ASSISTANT

## 2023-07-26 PROCEDURE — 87491 CHLMYD TRACH DNA AMP PROBE: CPT

## 2023-07-26 PROCEDURE — 81002 URINALYSIS NONAUTO W/O SCOPE: CPT | Performed by: PHYSICIAN ASSISTANT

## 2023-07-26 PROCEDURE — 87510 GARDNER VAG DNA DIR PROBE: CPT

## 2023-07-26 PROCEDURE — 3079F DIAST BP 80-89 MM HG: CPT | Performed by: PHYSICIAN ASSISTANT

## 2023-07-26 PROCEDURE — 99214 OFFICE O/P EST MOD 30 MIN: CPT | Mod: 25 | Performed by: PHYSICIAN ASSISTANT

## 2023-07-26 PROCEDURE — 81025 URINE PREGNANCY TEST: CPT | Performed by: PHYSICIAN ASSISTANT

## 2023-07-26 PROCEDURE — 87591 N.GONORRHOEAE DNA AMP PROB: CPT

## 2023-07-26 PROCEDURE — 87660 TRICHOMONAS VAGIN DIR PROBE: CPT

## 2023-07-26 RX ORDER — METHYLPHENIDATE HYDROCHLORIDE 5 MG/1
5 TABLET ORAL DAILY
COMMUNITY
Start: 2023-06-14 | End: 2023-07-26

## 2023-07-26 RX ORDER — METRONIDAZOLE 500 MG/1
500 TABLET ORAL 2 TIMES DAILY
Qty: 14 TABLET | Refills: 0 | Status: SHIPPED | OUTPATIENT
Start: 2023-07-26 | End: 2023-08-07

## 2023-07-26 ASSESSMENT — ENCOUNTER SYMPTOMS
CONSTITUTIONAL NEGATIVE: 1
RESPIRATORY NEGATIVE: 1
ABDOMINAL PAIN: 1
VOMITING: 0
CARDIOVASCULAR NEGATIVE: 1
FLANK PAIN: 0
DIARRHEA: 0

## 2023-07-26 NOTE — TELEPHONE ENCOUNTER
Patient called because she needs a new prescription written up for her Valtrex. She said the half dose was filled, but that the pharmacy said her 1 mg dose was canceled today by Dr. Fair.     If she could please have a new prescription for the 1 mg dose sent in.

## 2023-07-27 LAB
C TRACH DNA GENITAL QL NAA+PROBE: NEGATIVE
CANDIDA DNA VAG QL PROBE+SIG AMP: NEGATIVE
G VAGINALIS DNA VAG QL PROBE+SIG AMP: POSITIVE
N GONORRHOEA DNA GENITAL QL NAA+PROBE: NEGATIVE
SPECIMEN SOURCE: NORMAL
T VAGINALIS DNA VAG QL PROBE+SIG AMP: NEGATIVE

## 2023-07-27 RX ORDER — VALACYCLOVIR HYDROCHLORIDE 1 G/1
TABLET, FILM COATED ORAL
Qty: 30 TABLET | Refills: 1 | Status: CANCELLED | OUTPATIENT
Start: 2023-07-27

## 2023-07-28 NOTE — TELEPHONE ENCOUNTER
Attempted to call patient today, reached voicemail, will attempt to call later to clarify her current valtrex dose. I did not cancel her valtrex 1g prn medication.

## 2023-08-04 RX ORDER — VALACYCLOVIR HYDROCHLORIDE 500 MG/1
500 TABLET, FILM COATED ORAL DAILY
Qty: 30 TABLET | Refills: 3 | Status: SHIPPED | OUTPATIENT
Start: 2023-08-04 | End: 2024-03-05

## 2023-08-04 NOTE — TELEPHONE ENCOUNTER
Clarified with patient by phone, she was able to obtain the valtrex 1g PRN prescription with no issue. She is having trouble obtaining refills on the valtrex 500mg daily dose she takes for maintenance. Will refill.

## 2023-08-07 ENCOUNTER — OFFICE VISIT (OUTPATIENT)
Dept: INTERNAL MEDICINE | Facility: OTHER | Age: 38
End: 2023-08-07
Payer: MEDICAID

## 2023-08-07 ENCOUNTER — TELEPHONE (OUTPATIENT)
Dept: INTERNAL MEDICINE | Facility: OTHER | Age: 38
End: 2023-08-07

## 2023-08-07 VITALS
WEIGHT: 164.8 LBS | DIASTOLIC BLOOD PRESSURE: 66 MMHG | TEMPERATURE: 97.5 F | HEIGHT: 65 IN | HEART RATE: 83 BPM | BODY MASS INDEX: 27.46 KG/M2 | OXYGEN SATURATION: 97 % | SYSTOLIC BLOOD PRESSURE: 104 MMHG

## 2023-08-07 DIAGNOSIS — G47.00 INSOMNIA, UNSPECIFIED TYPE: ICD-10-CM

## 2023-08-07 DIAGNOSIS — M54.2 NECK PAIN: ICD-10-CM

## 2023-08-07 DIAGNOSIS — E28.2 POLYCYSTIC OVARIES: ICD-10-CM

## 2023-08-07 DIAGNOSIS — L98.9 SKIN LESION: ICD-10-CM

## 2023-08-07 PROCEDURE — 3078F DIAST BP <80 MM HG: CPT | Mod: GC | Performed by: STUDENT IN AN ORGANIZED HEALTH CARE EDUCATION/TRAINING PROGRAM

## 2023-08-07 PROCEDURE — 99213 OFFICE O/P EST LOW 20 MIN: CPT | Mod: GE | Performed by: STUDENT IN AN ORGANIZED HEALTH CARE EDUCATION/TRAINING PROGRAM

## 2023-08-07 PROCEDURE — 3074F SYST BP LT 130 MM HG: CPT | Mod: GC | Performed by: STUDENT IN AN ORGANIZED HEALTH CARE EDUCATION/TRAINING PROGRAM

## 2023-08-07 RX ORDER — METHYLPHENIDATE HYDROCHLORIDE 10 MG/1
10 TABLET ORAL DAILY
COMMUNITY
Start: 2023-07-26 | End: 2023-11-29

## 2023-08-07 RX ORDER — DICLOFENAC SODIUM 30 MG/G
1 GEL TOPICAL 4 TIMES DAILY
Qty: 100 G | Refills: 1 | Status: SHIPPED | OUTPATIENT
Start: 2023-08-07 | End: 2023-09-06

## 2023-08-07 NOTE — TELEPHONE ENCOUNTER
DOCUMENTATION OF PAR STATUS:    1. Name of Medication & Dose: Diclofenac Sodium 3% gel      2. Name of Prescription Coverage Company & phone #: [employer] [place] [employer]      3. Date Prior Auth Submitted: 08/07/23    4. What information was given to obtain insurance decision? Notes, ICD-10 Codes    5. Prior Auth Status? Pending    6. Patient Notified: yes
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
## 2023-08-08 RX ORDER — DICLOFENAC SODIUM 30 MG/G
GEL TOPICAL
Qty: 100 G | Refills: 1 | OUTPATIENT
Start: 2023-08-08

## 2023-10-05 ENCOUNTER — APPOINTMENT (OUTPATIENT)
Dept: PHYSICAL THERAPY | Facility: REHABILITATION | Age: 38
End: 2023-10-05
Attending: STUDENT IN AN ORGANIZED HEALTH CARE EDUCATION/TRAINING PROGRAM
Payer: MEDICAID

## 2023-10-10 ENCOUNTER — APPOINTMENT (OUTPATIENT)
Dept: URGENT CARE | Facility: PHYSICIAN GROUP | Age: 38
End: 2023-10-10
Payer: MEDICAID

## 2023-10-19 ENCOUNTER — APPOINTMENT (OUTPATIENT)
Dept: PHYSICAL THERAPY | Facility: REHABILITATION | Age: 38
End: 2023-10-19
Attending: STUDENT IN AN ORGANIZED HEALTH CARE EDUCATION/TRAINING PROGRAM
Payer: MEDICAID

## 2023-11-29 ENCOUNTER — OFFICE VISIT (OUTPATIENT)
Dept: INTERNAL MEDICINE | Facility: OTHER | Age: 38
End: 2023-11-29
Payer: MEDICAID

## 2023-11-29 VITALS
DIASTOLIC BLOOD PRESSURE: 70 MMHG | TEMPERATURE: 97.9 F | SYSTOLIC BLOOD PRESSURE: 105 MMHG | OXYGEN SATURATION: 98 % | WEIGHT: 171.6 LBS | HEIGHT: 67 IN | BODY MASS INDEX: 26.93 KG/M2 | HEART RATE: 85 BPM

## 2023-11-29 DIAGNOSIS — R51.9 CHRONIC NONINTRACTABLE HEADACHE, UNSPECIFIED HEADACHE TYPE: ICD-10-CM

## 2023-11-29 DIAGNOSIS — G89.29 CHRONIC NONINTRACTABLE HEADACHE, UNSPECIFIED HEADACHE TYPE: ICD-10-CM

## 2023-11-29 DIAGNOSIS — R21 RASH: ICD-10-CM

## 2023-11-29 PROCEDURE — 99214 OFFICE O/P EST MOD 30 MIN: CPT | Mod: GC

## 2023-11-29 PROCEDURE — 3078F DIAST BP <80 MM HG: CPT

## 2023-11-29 PROCEDURE — 3074F SYST BP LT 130 MM HG: CPT

## 2023-11-29 RX ORDER — KETOCONAZOLE 20 MG/ML
SHAMPOO TOPICAL
Qty: 120 ML | Refills: 1 | Status: SHIPPED | OUTPATIENT
Start: 2023-11-29 | End: 2024-02-12

## 2023-11-29 RX ORDER — AMITRIPTYLINE HYDROCHLORIDE 10 MG/1
10 TABLET, FILM COATED ORAL NIGHTLY
Qty: 30 TABLET | Refills: 2 | Status: SHIPPED | OUTPATIENT
Start: 2023-11-29 | End: 2023-12-12 | Stop reason: SDUPTHER
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
## 2023-11-29 NOTE — PATIENT INSTRUCTIONS
Referral placed for dermatology, neurology, and physical therapy.     Amitriptyline and ketoconazole shampoo sent to your pharmacy.

## 2023-11-30 PROBLEM — R51.9 CHRONIC NONINTRACTABLE HEADACHE: Status: ACTIVE | Noted: 2023-11-30

## 2023-11-30 PROBLEM — G89.29 CHRONIC NONINTRACTABLE HEADACHE: Status: ACTIVE | Noted: 2023-11-30

## 2023-11-30 ASSESSMENT — ENCOUNTER SYMPTOMS
SENSORY CHANGE: 0
TINGLING: 0
CARDIOVASCULAR NEGATIVE: 1
MUSCULOSKELETAL NEGATIVE: 1
CHILLS: 0
SPEECH CHANGE: 0
RESPIRATORY NEGATIVE: 1
GASTROINTESTINAL NEGATIVE: 1
FOCAL WEAKNESS: 0
WEAKNESS: 0
FEVER: 0
HEADACHES: 1

## 2023-12-12 ENCOUNTER — OFFICE VISIT (OUTPATIENT)
Dept: NEUROLOGY | Facility: MEDICAL CENTER | Age: 38
End: 2023-12-12
Attending: PSYCHIATRY & NEUROLOGY
Payer: MEDICAID

## 2023-12-12 VITALS
DIASTOLIC BLOOD PRESSURE: 66 MMHG | SYSTOLIC BLOOD PRESSURE: 104 MMHG | OXYGEN SATURATION: 94 % | HEIGHT: 67 IN | HEART RATE: 107 BPM | WEIGHT: 172.62 LBS | BODY MASS INDEX: 27.09 KG/M2 | TEMPERATURE: 97.5 F

## 2023-12-12 DIAGNOSIS — R51.9 CHRONIC NONINTRACTABLE HEADACHE, UNSPECIFIED HEADACHE TYPE: ICD-10-CM

## 2023-12-12 DIAGNOSIS — G89.29 CHRONIC NONINTRACTABLE HEADACHE, UNSPECIFIED HEADACHE TYPE: ICD-10-CM

## 2023-12-12 PROCEDURE — 99202 OFFICE O/P NEW SF 15 MIN: CPT | Performed by: PSYCHIATRY & NEUROLOGY

## 2023-12-12 PROCEDURE — 3074F SYST BP LT 130 MM HG: CPT | Performed by: PSYCHIATRY & NEUROLOGY

## 2023-12-12 PROCEDURE — 3078F DIAST BP <80 MM HG: CPT | Performed by: PSYCHIATRY & NEUROLOGY

## 2023-12-12 PROCEDURE — 99204 OFFICE O/P NEW MOD 45 MIN: CPT | Performed by: PSYCHIATRY & NEUROLOGY

## 2023-12-12 RX ORDER — AMITRIPTYLINE HYDROCHLORIDE 10 MG/1
20 TABLET, FILM COATED ORAL NIGHTLY
Qty: 60 TABLET | Refills: 5 | Status: SHIPPED | OUTPATIENT
Start: 2023-12-12 | End: 2024-01-02

## 2023-12-12 NOTE — PATIENT INSTRUCTIONS
Please keep the diary of your headaches.    Please keep the diary of provoking factors for your headaches (e.g. alcohol, certain foods, etc).     Please take riboflavin (vitamin B2) 400 mg daily and magnesium oxide 400 mg daily for headache prevention (over-the-counter).    Please take amitriptyline 20 mg at bedtime for headache prevention.     Please let our office know if you experience any changes in your neurological status and/or any changes in the nature of your headaches.
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
## 2023-12-20 ENCOUNTER — TELEPHONE (OUTPATIENT)
Dept: NEUROLOGY | Facility: MEDICAL CENTER | Age: 38
End: 2023-12-20
Payer: MEDICAID

## 2023-12-20 NOTE — TELEPHONE ENCOUNTER
Noted. Ok to schedule her for an early follow up on either Monday or Wednesday as well, for 20 minutes visit. Please let me know if no openings in January 2024. Thank you.

## 2023-12-20 NOTE — TELEPHONE ENCOUNTER
12/20/23  Patient called to report that after you doubled her Amitriptyline she woke up very swollen and had a hard time breathing. She took some Benadryl and all of the symptoms stopped. She has since stopped taking the medication all together and will not continue until she has a F/U with you. As of right now she cannot get in with you until March. I have left a voicemail with patient to try and schedule a sooner appointment with her. HL

## 2023-12-21 NOTE — TELEPHONE ENCOUNTER
I have looked through your schedule and you do not have any availability until February. I have spoken to patient and she is having extreme headaches at this time. Please advise. Thanks. Hope

## 2023-12-22 ENCOUNTER — TELEPHONE (OUTPATIENT)
Dept: SCHEDULING | Facility: IMAGING CENTER | Age: 38
End: 2023-12-22

## 2023-12-22 NOTE — TELEPHONE ENCOUNTER
I have scheduled her in the 11:40 spot on 01/02/24. Please let me know if I can assist you any further with this patient. Thanks, Marissa

## 2023-12-22 NOTE — TELEPHONE ENCOUNTER
Caller: Gwendolyn Hernandez     Topic/issue: Patient returning a phone call for sooner appointment with Dr. Clayton. Please see Encounter 12/20/2023.    Callback Number: [phone]      Thank you,  Petrona CASTELLANO

## 2024-01-02 ENCOUNTER — OFFICE VISIT (OUTPATIENT)
Dept: NEUROLOGY | Facility: MEDICAL CENTER | Age: 39
End: 2024-01-02
Attending: PSYCHIATRY & NEUROLOGY
Payer: MEDICAID

## 2024-01-02 ENCOUNTER — TELEPHONE (OUTPATIENT)
Dept: NEUROLOGY | Facility: MEDICAL CENTER | Age: 39
End: 2024-01-02

## 2024-01-02 VITALS
WEIGHT: 173.06 LBS | DIASTOLIC BLOOD PRESSURE: 60 MMHG | OXYGEN SATURATION: 95 % | HEIGHT: 67 IN | TEMPERATURE: 97.7 F | SYSTOLIC BLOOD PRESSURE: 104 MMHG | HEART RATE: 101 BPM | BODY MASS INDEX: 27.16 KG/M2

## 2024-01-02 DIAGNOSIS — R51.9 CHRONIC NONINTRACTABLE HEADACHE, UNSPECIFIED HEADACHE TYPE: ICD-10-CM

## 2024-01-02 DIAGNOSIS — G89.29 CHRONIC NONINTRACTABLE HEADACHE, UNSPECIFIED HEADACHE TYPE: ICD-10-CM

## 2024-01-02 DIAGNOSIS — M54.2 CERVICALGIA OF OCCIPITO-ATLANTO-AXIAL REGION: ICD-10-CM

## 2024-01-02 PROCEDURE — 99211 OFF/OP EST MAY X REQ PHY/QHP: CPT | Performed by: PSYCHIATRY & NEUROLOGY

## 2024-01-02 PROCEDURE — 3078F DIAST BP <80 MM HG: CPT | Performed by: PSYCHIATRY & NEUROLOGY

## 2024-01-02 PROCEDURE — 3074F SYST BP LT 130 MM HG: CPT | Performed by: PSYCHIATRY & NEUROLOGY

## 2024-01-02 PROCEDURE — 99214 OFFICE O/P EST MOD 30 MIN: CPT | Performed by: PSYCHIATRY & NEUROLOGY

## 2024-01-02 RX ORDER — TOPIRAMATE 50 MG/1
50 TABLET, FILM COATED ORAL DAILY
Qty: 30 TABLET | Refills: 3 | Status: SHIPPED | OUTPATIENT
Start: 2024-01-02 | End: 2024-02-12

## 2024-01-02 ASSESSMENT — PATIENT HEALTH QUESTIONNAIRE - PHQ9: CLINICAL INTERPRETATION OF PHQ2 SCORE: 0

## 2024-01-02 NOTE — PATIENT INSTRUCTIONS
Please keep the diary of your headaches.    Please keep the diary of provoking factors for your headaches (e.g. alcohol, certain foods, etc).     Please take riboflavin (vitamin B2) 400 mg daily and magnesium oxide 400 mg daily for headache prevention (over-the-counter).    Please take Topamax (topiramate) 25 mg every evening for 2 weeks, and then increase to 50 mg every evening, and continue.     Please let our office know if you experience any changes in your neurological status and/or any changes in the nature of your headaches.     Please seek emergent medical attention if you experience any new/worsening neck/back pain, weakness/numbness, bladder/bowel incontinence, balance/walking difficulties, and/or any new/worsening neurological problem(s).

## 2024-01-02 NOTE — TELEPHONE ENCOUNTER
Received Refill PA request via [employer]  for Topiramate 50mg tab. (Quantity:30 tab, Day Supply:30)     Insurance: [employer]  Member ID:  [id number]  BIN: [id number]  PCN: [id number]  [id number]     Ran Test claim via [employer] medication Pays for a $0.00 copay. Will outreach to patient to offer specialty pharmacy services and or release to preferred pharmacy
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
## 2024-01-04 ENCOUNTER — TELEMEDICINE (OUTPATIENT)
Dept: INTERNAL MEDICINE | Facility: OTHER | Age: 39
End: 2024-01-04
Payer: MEDICAID

## 2024-01-04 ENCOUNTER — HOSPITAL ENCOUNTER (OUTPATIENT)
Dept: LAB | Facility: MEDICAL CENTER | Age: 39
End: 2024-01-04
Payer: MEDICAID

## 2024-01-04 VITALS — HEIGHT: 67 IN | BODY MASS INDEX: 27.15 KG/M2 | WEIGHT: 173 LBS

## 2024-01-04 DIAGNOSIS — R51.9 CHRONIC NONINTRACTABLE HEADACHE, UNSPECIFIED HEADACHE TYPE: ICD-10-CM

## 2024-01-04 DIAGNOSIS — G47.9 SLEEP DISTURBANCE: ICD-10-CM

## 2024-01-04 DIAGNOSIS — Z13.228 SCREENING FOR METABOLIC DISORDER: ICD-10-CM

## 2024-01-04 DIAGNOSIS — Z86.39 HISTORY OF VITAMIN D DEFICIENCY: ICD-10-CM

## 2024-01-04 DIAGNOSIS — G89.29 CHRONIC NONINTRACTABLE HEADACHE, UNSPECIFIED HEADACHE TYPE: ICD-10-CM

## 2024-01-04 DIAGNOSIS — Z11.3 SCREENING FOR STD (SEXUALLY TRANSMITTED DISEASE): ICD-10-CM

## 2024-01-04 DIAGNOSIS — R41.840 IMPAIRED CONCENTRATION: ICD-10-CM

## 2024-01-04 DIAGNOSIS — R63.5 WEIGHT GAIN: ICD-10-CM

## 2024-01-04 DIAGNOSIS — Z13.220 SCREENING FOR LIPID DISORDERS: ICD-10-CM

## 2024-01-04 LAB
EST. AVERAGE GLUCOSE BLD GHB EST-MCNC: 103 MG/DL
HBA1C MFR BLD: 5.2 % (ref 4–5.6)

## 2024-01-04 PROCEDURE — 87389 HIV-1 AG W/HIV-1&-2 AB AG IA: CPT

## 2024-01-04 PROCEDURE — 36415 COLL VENOUS BLD VENIPUNCTURE: CPT

## 2024-01-04 PROCEDURE — 80053 COMPREHEN METABOLIC PANEL: CPT

## 2024-01-04 PROCEDURE — 82306 VITAMIN D 25 HYDROXY: CPT

## 2024-01-04 PROCEDURE — 87491 CHLMYD TRACH DNA AMP PROBE: CPT

## 2024-01-04 PROCEDURE — 87591 N.GONORRHOEAE DNA AMP PROB: CPT

## 2024-01-04 PROCEDURE — 83036 HEMOGLOBIN GLYCOSYLATED A1C: CPT

## 2024-01-04 PROCEDURE — 99214 OFFICE O/P EST MOD 30 MIN: CPT | Mod: GT,GC

## 2024-01-04 PROCEDURE — 80061 LIPID PANEL: CPT

## 2024-01-04 PROCEDURE — 86780 TREPONEMA PALLIDUM: CPT

## 2024-01-04 PROCEDURE — 84443 ASSAY THYROID STIM HORMONE: CPT

## 2024-01-04 PROCEDURE — 84439 ASSAY OF FREE THYROXINE: CPT

## 2024-01-04 RX ORDER — DOXYLAMINE SUCCINATE 25 MG/1
TABLET ORAL
Qty: 30 TABLET | Refills: 0 | Status: SHIPPED | OUTPATIENT
Start: 2024-01-04 | End: 2024-03-15
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
## 2024-01-05 LAB
25(OH)D3 SERPL-MCNC: 21 NG/ML (ref 30–100)
ALBUMIN SERPL BCP-MCNC: 4.4 G/DL (ref 3.2–4.9)
ALBUMIN/GLOB SERPL: 1.6 G/DL
ALP SERPL-CCNC: 58 U/L (ref 30–99)
ALT SERPL-CCNC: 23 U/L (ref 2–50)
ANION GAP SERPL CALC-SCNC: 10 MMOL/L (ref 7–16)
AST SERPL-CCNC: 16 U/L (ref 12–45)
BILIRUB SERPL-MCNC: 0.6 MG/DL (ref 0.1–1.5)
BUN SERPL-MCNC: 9 MG/DL (ref 8–22)
C TRACH DNA SPEC QL NAA+PROBE: NEGATIVE
CALCIUM ALBUM COR SERPL-MCNC: 8.8 MG/DL (ref 8.5–10.5)
CALCIUM SERPL-MCNC: 9.1 MG/DL (ref 8.5–10.5)
CHLORIDE SERPL-SCNC: 104 MMOL/L (ref 96–112)
CHOLEST SERPL-MCNC: 186 MG/DL (ref 100–199)
CO2 SERPL-SCNC: 26 MMOL/L (ref 20–33)
CREAT SERPL-MCNC: 0.57 MG/DL (ref 0.5–1.4)
FASTING STATUS PATIENT QL REPORTED: NORMAL
GFR SERPLBLD CREATININE-BSD FMLA CKD-EPI: 118 ML/MIN/1.73 M 2
GLOBULIN SER CALC-MCNC: 2.7 G/DL (ref 1.9–3.5)
GLUCOSE SERPL-MCNC: 97 MG/DL (ref 65–99)
HDLC SERPL-MCNC: 61 MG/DL
HIV 1+2 AB+HIV1 P24 AG SERPL QL IA: NORMAL
LDLC SERPL CALC-MCNC: 106 MG/DL
N GONORRHOEA DNA SPEC QL NAA+PROBE: NEGATIVE
POTASSIUM SERPL-SCNC: 4.1 MMOL/L (ref 3.6–5.5)
PROT SERPL-MCNC: 7.1 G/DL (ref 6–8.2)
SODIUM SERPL-SCNC: 140 MMOL/L (ref 135–145)
SPECIMEN SOURCE: NORMAL
T PALLIDUM AB SER QL IA: NORMAL
T4 FREE SERPL-MCNC: 1.15 NG/DL (ref 0.93–1.7)
TRIGL SERPL-MCNC: 96 MG/DL (ref 0–149)
TSH SERPL DL<=0.005 MIU/L-ACNC: 1.74 UIU/ML (ref 0.38–5.33)

## 2024-01-06 ASSESSMENT — ENCOUNTER SYMPTOMS
CONSTITUTIONAL NEGATIVE: 1
GASTROINTESTINAL NEGATIVE: 1
RESPIRATORY NEGATIVE: 1
CARDIOVASCULAR NEGATIVE: 1
HEADACHES: 1
EYES NEGATIVE: 1
INSOMNIA: 1

## 2024-01-10 ENCOUNTER — OFFICE VISIT (OUTPATIENT)
Dept: INTERNAL MEDICINE | Facility: OTHER | Age: 39
End: 2024-01-10
Payer: MEDICAID

## 2024-01-10 VITALS
DIASTOLIC BLOOD PRESSURE: 72 MMHG | HEIGHT: 67 IN | BODY MASS INDEX: 27.28 KG/M2 | TEMPERATURE: 98.1 F | SYSTOLIC BLOOD PRESSURE: 104 MMHG | OXYGEN SATURATION: 96 % | WEIGHT: 173.8 LBS | HEART RATE: 97 BPM

## 2024-01-10 DIAGNOSIS — G89.29 CHRONIC NONINTRACTABLE HEADACHE, UNSPECIFIED HEADACHE TYPE: ICD-10-CM

## 2024-01-10 DIAGNOSIS — R51.9 CHRONIC NONINTRACTABLE HEADACHE, UNSPECIFIED HEADACHE TYPE: ICD-10-CM

## 2024-01-10 DIAGNOSIS — G47.9 SLEEP DISTURBANCE: ICD-10-CM

## 2024-01-10 DIAGNOSIS — Z86.39 HISTORY OF VITAMIN D DEFICIENCY: ICD-10-CM

## 2024-01-10 PROCEDURE — 3078F DIAST BP <80 MM HG: CPT

## 2024-01-10 PROCEDURE — 99213 OFFICE O/P EST LOW 20 MIN: CPT | Mod: GE

## 2024-01-10 PROCEDURE — 3074F SYST BP LT 130 MM HG: CPT

## 2024-01-10 ASSESSMENT — ENCOUNTER SYMPTOMS
ABDOMINAL PAIN: 0
RESPIRATORY NEGATIVE: 1
NAUSEA: 0
CARDIOVASCULAR NEGATIVE: 1
EYES NEGATIVE: 1
GASTROINTESTINAL NEGATIVE: 1
INSOMNIA: 0
CONSTITUTIONAL NEGATIVE: 1
VOMITING: 0
HEADACHES: 1
PALPITATIONS: 0
BACK PAIN: 1
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
## 2024-01-30 ENCOUNTER — PHYSICAL THERAPY (OUTPATIENT)
Dept: PHYSICAL THERAPY | Facility: REHABILITATION | Age: 39
End: 2024-01-30
Payer: MEDICAID

## 2024-01-30 DIAGNOSIS — G89.29 CHRONIC NONINTRACTABLE HEADACHE, UNSPECIFIED HEADACHE TYPE: ICD-10-CM

## 2024-01-30 DIAGNOSIS — R51.9 CHRONIC NONINTRACTABLE HEADACHE, UNSPECIFIED HEADACHE TYPE: ICD-10-CM

## 2024-01-30 PROCEDURE — 97161 PT EVAL LOW COMPLEX 20 MIN: CPT

## 2024-01-30 SDOH — ECONOMIC STABILITY: GENERAL: QUALITY OF LIFE: FAIR

## 2024-01-30 ASSESSMENT — ENCOUNTER SYMPTOMS
PAIN SCALE AT HIGHEST: 6
PAIN SCALE AT LOWEST: 1
PAIN SCALE: 1

## 2024-01-30 NOTE — OP THERAPY EVALUATION
Outpatient Physical Therapy  INITIAL EVALUATION    [facility] [place]  [address], [address]  [facility] [address]  Phone:  [phone]  Fax:  [phone]    Date of Evaluation: 2024    Patient: Raquel Hrenandez  YOB: 1985  MRN: [id number]     Referring Provider: Karime Fair D.O.  [address] [place] [address]   Referring Diagnosis Chronic nonintractable headache, unspecified headache type [R51.9, G89.29]     Time Calculation  Start time: 1535  Stop time: 1620 Time Calculation (min): 45 minutes         Chief Complaint: Headache    Visit Diagnoses     ICD-10-CM   1. Chronic nonintractable headache, unspecified headache type  R51.9    G89.29       Date of onset of impairment: 2023    Subjective:   History of Present Illness:     Date of onset:  2023  Quality of life:  Fair  Prior level of function:  Ongoing head aches  Pain:     Current pain ratin    At best pain ratin    At worst pain ratin  Activities of Daily Living:     Patient reported ADL status: Limited with neck mobility  Difficulty with focus while reading  Difficulty with driving  Patient Goals:     Patient goals for therapy:  Decreased pain and increased motion  Patient is a 38 y.o. female that presents to therapy with HAs. States that symptoms were due to injury. Reports the pain quality to be sharp/dull, constant and are primarily about the SO area and down the central spine. Reports that symptoms now worsening. States that aggravating factors are lifting, bending, flexion. States that easng factors are ice.       Past Medical History:   Diagnosis Date    Anemia     Gynecological disorder      Past Surgical History:   Procedure Laterality Date    HYSTERECTOMY ROBOTIC Bilateral 2016    Procedure: HYSTERECTOMY ROBOTIC WITH MODESTA SALPINGECTOMY ;  Surgeon: Samantha Barnes M.D.;  Location: SURGERY SAME DAY [facility];  Service:     OTHER  3-2016    left shoulder lump removed     CHOLECYSTECTOMY      OTHER  4-1-2016 teeth extraction     Social History     Tobacco Use    Smoking status: Never    Smokeless tobacco: Never   Substance Use Topics    Alcohol use: Not Currently     Comment: maybe 3 times a year     Family and Occupational History     Socioeconomic History    Marital status:      Spouse name: Not on file    Number of children: Not on file    Years of education: Not on file    Highest education level: Not on file   Occupational History    Not on file       Objective     Postural Observations  Seated posture: poor  Standing posture: poor      Shoulder Screen    Shoulder active range of motion within functional limits.    Neurological Testing     Reflexes   Left   Biceps (C5/C6): trace (1+)  Triceps (C7): trace (1+)    Right   Biceps (C5/C6): trace (1+)  Triceps (C7): trace (1+)    Myotome testing   Cervical (left)   C4 (shoulder shrug): 5  C5 (deltoid): 5  C6 (biceps): 5  C7 (triceps): 5  C8 (thumb extension): 4+  T1 (intrinsics): 5    Cervical (right)   C4 (shoulder shrug): 5  C5 (deltoid): 5  C6 (biceps): 5  C7 (triceps): 5  C8 (thumb extension): 5  T1 (intrinsics): 5    Dermatome testing   Cervical (left)   All left cervical dermatomes intact    Cervical (right)   All right cervical dermatomes intact    Additional Neurological Details  Limited     Palpation   Left   Hypertonic in the levator scapulae, thoracic paraspinals and upper trapezius.     Right   Hypertonic in the levator scapulae, thoracic paraspinals and upper trapezius.     Active Range of Motion     Cervical Spine   Flexion: Active cervical flexion: 39deg.  Extension: Active cervical extension: 23deg.  Left lateral flexion: Active left cervical lateral flexion: 20deg.  Right lateral flexion: Active right cervical lateral flexion: 35deg.  Left rotation: Active left cervical rotation: 53deg.  Right rotation: Active right cervical rotation: 40deg.    Tests   Cervical spine     Left Spine   Negative alar ligament  integrity, Sharp-Antohny test and vertebral artery test.     Right spine   Negative alar ligament integrity, Sharp-Anthony test and vertebral artery test.     Ct Cervical Test     Lying repeated motions:   Retraction in lying     Symptoms during testing: no effect     Symptoms after testing: no effect        Therapeutic Exercises (CPT 33574):     1. trial supine chin tuck, x5-10 every 4 hours    2. HOME EXERCISE TRIAL      Time-based treatments/modalities:           Assessment, Response and Plan:   Assessment details:  Patient presents with signs and symptoms consistent with a cervical derangement vs post concussion syndrome. Patient limitations include weakness, decreased ROM, and pain. Patient will benefit from skilled therapy to improve the aforementioned deficits and decrease further functional decline.   Prognosis comment:  Fair to poor  Goals:   Short Term Goals:   1) Patient's cervical ROM rot will improve by 10deg to facilitate improved view of environment.  2) Patient's cervical flexion ROM will improve by 10deg to facilitate improved reading tolerance.   Short term goal time span:  2-4 weeks  Long term goal time span:  6-8 weeks  Patient progress towards Long Term goals:  1) Patient's symptoms will improve to allow for less than 4 HA per week.  2) NDI at next visit    Plan:   Therapy options:  Physical therapy treatment to continue  Planned therapy interventions:  E Stim Unattended (CPT 11385), Manual Therapy (CPT 56490), Neuromuscular Re-education (CPT 36260), Therapeutic Exercise (CPT 61155) and Hot or Cold Pack Therapy (CPT 41131)  Frequency:  2x week  Duration in weeks:  8  Discussed with:  Patient  Plan details:  1-2x/wk x 8 weeks for 3 x [id number] 1x [id number]      Functional Assessment Used    NDI at next visit    Referring provider co-signature:  I have reviewed this plan of care and my co-signature certifies the need for services.    Certification Period: 01/30/2024 to  03/27/24    Physician  Signature: ________________________________ Date: ______________

## 2024-02-01 ENCOUNTER — HOSPITAL ENCOUNTER (OUTPATIENT)
Dept: RADIOLOGY | Facility: MEDICAL CENTER | Age: 39
End: 2024-02-01
Attending: PSYCHIATRY & NEUROLOGY
Payer: MEDICAID

## 2024-02-01 DIAGNOSIS — R51.9 CHRONIC NONINTRACTABLE HEADACHE, UNSPECIFIED HEADACHE TYPE: ICD-10-CM

## 2024-02-01 DIAGNOSIS — G89.29 CHRONIC NONINTRACTABLE HEADACHE, UNSPECIFIED HEADACHE TYPE: ICD-10-CM

## 2024-02-01 DIAGNOSIS — M54.2 CERVICALGIA OF OCCIPITO-ATLANTO-AXIAL REGION: ICD-10-CM

## 2024-02-01 PROCEDURE — 72156 MRI NECK SPINE W/O & W/DYE: CPT

## 2024-02-01 PROCEDURE — 700117 HCHG RX CONTRAST REV CODE 255: Performed by: PSYCHIATRY & NEUROLOGY

## 2024-02-01 PROCEDURE — 70553 MRI BRAIN STEM W/O & W/DYE: CPT

## 2024-02-01 PROCEDURE — A9579 GAD-BASE MR CONTRAST NOS,1ML: HCPCS | Performed by: PSYCHIATRY & NEUROLOGY

## 2024-02-01 RX ADMIN — GADOTERIDOL 15 ML: 279.3 INJECTION, SOLUTION INTRAVENOUS at 08:30

## 2024-02-02 ENCOUNTER — PATIENT MESSAGE (OUTPATIENT)
Dept: INTERNAL MEDICINE | Facility: OTHER | Age: 39
End: 2024-02-02
Payer: MEDICAID

## 2024-02-02 DIAGNOSIS — I63.81 LACUNAR INFARCTION (HCC): ICD-10-CM

## 2024-02-02 DIAGNOSIS — R93.0 ABNORMAL MRI OF HEAD: ICD-10-CM

## 2024-02-06 NOTE — PATIENT COMMUNICATION
Spoke with patient on phone, MRI Brain showed subcentimeter foci of encephalomalacia x 3 in L cerebellar hemisphere most consistent with old lacunar infarcts or other remote insult. Recommended patient to take baby aspirin daily per neuro recs. Will order Ziopatch. Patient states she has a CTA scheduled for Thurs and Echo on Friday. Patient states she stopped taking her topamax, meloxicam, and unisom as she felt they were not helping with her headaches. Next appt with neurology on 3/8/24

## 2024-02-08 ENCOUNTER — HOSPITAL ENCOUNTER (OUTPATIENT)
Dept: CARDIOLOGY | Facility: MEDICAL CENTER | Age: 39
End: 2024-02-08
Attending: PSYCHIATRY & NEUROLOGY
Payer: MEDICAID

## 2024-02-08 DIAGNOSIS — I63.9 CEREBROVASCULAR ACCIDENT (CVA), UNSPECIFIED MECHANISM (HCC): ICD-10-CM

## 2024-02-08 LAB
LV EJECT FRACT  99904: 60
LV EJECT FRACT MOD 2C 99903: 63.68
LV EJECT FRACT MOD 4C 99902: 56.54
LV EJECT FRACT MOD BP 99901: 59.8

## 2024-02-08 PROCEDURE — 93306 TTE W/DOPPLER COMPLETE: CPT

## 2024-02-08 PROCEDURE — 93306 TTE W/DOPPLER COMPLETE: CPT | Mod: 26 | Performed by: INTERNAL MEDICINE

## 2024-02-09 ENCOUNTER — HOSPITAL ENCOUNTER (OUTPATIENT)
Dept: RADIOLOGY | Facility: MEDICAL CENTER | Age: 39
End: 2024-02-09
Attending: PSYCHIATRY & NEUROLOGY
Payer: MEDICAID

## 2024-02-09 DIAGNOSIS — I63.9 CEREBROVASCULAR ACCIDENT (CVA), UNSPECIFIED MECHANISM (HCC): ICD-10-CM

## 2024-02-09 PROCEDURE — 70498 CT ANGIOGRAPHY NECK: CPT

## 2024-02-09 PROCEDURE — 700117 HCHG RX CONTRAST REV CODE 255: Mod: UD | Performed by: PSYCHIATRY & NEUROLOGY

## 2024-02-09 PROCEDURE — 70496 CT ANGIOGRAPHY HEAD: CPT

## 2024-02-09 RX ADMIN — IOHEXOL 100 ML: 350 INJECTION, SOLUTION INTRAVENOUS at 15:14

## 2024-02-12 ENCOUNTER — OFFICE VISIT (OUTPATIENT)
Dept: NEUROLOGY | Facility: MEDICAL CENTER | Age: 39
End: 2024-02-12
Attending: PSYCHIATRY & NEUROLOGY
Payer: MEDICAID

## 2024-02-12 ENCOUNTER — TELEPHONE (OUTPATIENT)
Dept: CARDIOLOGY | Facility: MEDICAL CENTER | Age: 39
End: 2024-02-12

## 2024-02-12 ENCOUNTER — TELEMEDICINE (OUTPATIENT)
Dept: INTERNAL MEDICINE | Facility: OTHER | Age: 39
End: 2024-02-12
Payer: MEDICAID

## 2024-02-12 VITALS — TEMPERATURE: 98 F | WEIGHT: 170 LBS | BODY MASS INDEX: 26.68 KG/M2 | HEIGHT: 67 IN

## 2024-02-12 VITALS
DIASTOLIC BLOOD PRESSURE: 62 MMHG | TEMPERATURE: 97.9 F | WEIGHT: 177.03 LBS | RESPIRATION RATE: 18 BRPM | BODY MASS INDEX: 27.79 KG/M2 | SYSTOLIC BLOOD PRESSURE: 110 MMHG | HEART RATE: 97 BPM | OXYGEN SATURATION: 98 % | HEIGHT: 67 IN

## 2024-02-12 DIAGNOSIS — R93.0 ABNORMAL MRI OF HEAD: ICD-10-CM

## 2024-02-12 DIAGNOSIS — R93.1 ABNORMAL ECHOCARDIOGRAM: ICD-10-CM

## 2024-02-12 DIAGNOSIS — I63.9 CEREBROVASCULAR ACCIDENT (CVA), UNSPECIFIED MECHANISM (HCC): ICD-10-CM

## 2024-02-12 DIAGNOSIS — G93.89 ENCEPHALOMALACIA: ICD-10-CM

## 2024-02-12 PROCEDURE — 1125F AMNT PAIN NOTED PAIN PRSNT: CPT | Performed by: PSYCHIATRY & NEUROLOGY

## 2024-02-12 PROCEDURE — 99212 OFFICE O/P EST SF 10 MIN: CPT | Performed by: PSYCHIATRY & NEUROLOGY

## 2024-02-12 PROCEDURE — 3078F DIAST BP <80 MM HG: CPT | Performed by: PSYCHIATRY & NEUROLOGY

## 2024-02-12 PROCEDURE — 99214 OFFICE O/P EST MOD 30 MIN: CPT | Mod: GT,GC

## 2024-02-12 PROCEDURE — 99215 OFFICE O/P EST HI 40 MIN: CPT | Performed by: PSYCHIATRY & NEUROLOGY

## 2024-02-12 PROCEDURE — 3074F SYST BP LT 130 MM HG: CPT | Performed by: PSYCHIATRY & NEUROLOGY

## 2024-02-12 RX ORDER — PROPRANOLOL HYDROCHLORIDE 10 MG/1
10 TABLET ORAL 3 TIMES DAILY
Qty: 90 TABLET | Refills: 3 | Status: SHIPPED | OUTPATIENT
Start: 2024-02-12 | End: 2024-03-18

## 2024-02-12 RX ORDER — ATORVASTATIN CALCIUM 40 MG/1
40 TABLET, FILM COATED ORAL NIGHTLY
Qty: 30 TABLET | Refills: 5 | Status: SHIPPED | OUTPATIENT
Start: 2024-02-12

## 2024-02-12 RX ORDER — HYDROCODONE BITARTRATE AND ACETAMINOPHEN 5; 325 MG/1; MG/1
1 TABLET ORAL PRN
COMMUNITY
End: 2024-03-15

## 2024-02-12 RX ORDER — BUTALBITAL, ACETAMINOPHEN AND CAFFEINE 50; 325; 40 MG/1; MG/1; MG/1
1 TABLET ORAL PRN
COMMUNITY

## 2024-02-12 RX ORDER — ASPIRIN 81 MG/1
81 TABLET ORAL DAILY
Qty: 30 TABLET | Refills: 5 | Status: SHIPPED | OUTPATIENT
Start: 2024-02-12

## 2024-02-12 ASSESSMENT — PATIENT HEALTH QUESTIONNAIRE - PHQ9: CLINICAL INTERPRETATION OF PHQ2 SCORE: 0

## 2024-02-12 ASSESSMENT — PAIN SCALES - GENERAL: PAINLEVEL: 2=MINIMAL-SLIGHT
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
## 2024-02-12 NOTE — PATIENT INSTRUCTIONS
linda keep the diary of your headaches.    Please keep the diary of provoking factors for your headaches (e.g. alcohol, certain foods, etc).     Please take riboflavin (vitamin B2) 400 mg daily and magnesium oxide 400 mg daily for headache prevention (over-the-counter).    Please take propranolol 10 mg three times per day. Please note that this medication can cause slowing of heart rhythm, as well as low blood pressure/lightheadedness, so please be careful when standing up.     Please let our office know if you experience any changes in your neurological status and/or any changes in the nature of your headaches.     Please seek emergent medical attention if you experience any new/worsening neck/back pain, weakness/numbness, bladder/bowel incontinence, balance/walking difficulties, and/or any new/worsening neurological problem(s).     Please take aspirin 81 mg and atorvastatin 40 mg daily for stroke prevention.    Please follow up with cardiology and hematology for evaluation of stroke causes.    Please obtain MR thoracic spine to evaluate for cause of your neck pain.

## 2024-02-12 NOTE — TELEPHONE ENCOUNTER
Referral from: Dr. Aba Clayton for PFO consultation    Patient called on 2/12/2024.    Left message for patient requesting call back to discuss referral.      First attempt

## 2024-02-13 ASSESSMENT — ENCOUNTER SYMPTOMS
SHORTNESS OF BREATH: 1
VOMITING: 0
ABDOMINAL PAIN: 0
NAUSEA: 0
FEVER: 0
CHILLS: 0
HEADACHES: 1

## 2024-02-13 NOTE — TELEPHONE ENCOUNTER
Called and left message for patient requesting call back to discuss referral.       Second attempt   Home

## 2024-02-13 NOTE — TELEPHONE ENCOUNTER
Late Documentation:    Received call back from patient. Consultation with Dr. Erickson scheduled for 3/7/2024.    All questions answered.    Phone number given to patient for [facility] for any further questions or concerns.

## 2024-02-14 ENCOUNTER — PHYSICAL THERAPY (OUTPATIENT)
Dept: PHYSICAL THERAPY | Facility: REHABILITATION | Age: 39
End: 2024-02-14
Payer: MEDICAID

## 2024-02-14 ENCOUNTER — NON-PROVIDER VISIT (OUTPATIENT)
Dept: CARDIOLOGY | Facility: MEDICAL CENTER | Age: 39
End: 2024-02-14
Payer: MEDICAID

## 2024-02-14 DIAGNOSIS — I49.1 APC (ATRIAL PREMATURE CONTRACTIONS): ICD-10-CM

## 2024-02-14 DIAGNOSIS — R93.0 ABNORMAL MRI OF HEAD: ICD-10-CM

## 2024-02-14 DIAGNOSIS — R51.9 CHRONIC NONINTRACTABLE HEADACHE, UNSPECIFIED HEADACHE TYPE: ICD-10-CM

## 2024-02-14 DIAGNOSIS — I63.81 LACUNAR INFARCTION (HCC): ICD-10-CM

## 2024-02-14 DIAGNOSIS — G89.29 CHRONIC NONINTRACTABLE HEADACHE, UNSPECIFIED HEADACHE TYPE: ICD-10-CM

## 2024-02-14 DIAGNOSIS — I49.3 PVC'S (PREMATURE VENTRICULAR CONTRACTIONS): ICD-10-CM

## 2024-02-14 DIAGNOSIS — R00.0 SINUS TACHYCARDIA: ICD-10-CM

## 2024-02-14 PROCEDURE — 93246 EXT ECG>7D<15D RECORDING: CPT

## 2024-02-14 PROCEDURE — 97110 THERAPEUTIC EXERCISES: CPT

## 2024-02-14 NOTE — PROGRESS NOTES
Patient enrolled in the 14 day Zio Holter monitor per Karime Fair DO.  In clinic hook up, monitor s/n [id number].  Pending EOS.

## 2024-02-15 NOTE — OP THERAPY DAILY TREATMENT
Outpatient Physical Therapy  DAILY TREATMENT     [facility] [place]  [address], [address]  [place] [address]  Phone:  [phone]  Fax:  [phone]    Date: 02/14/2024    Patient: Raquel Hernandez  YOB: 1985  MRN: [id number]     Time Calculation    Start time: 1528  Stop time: 1613 Time Calculation (min): 45 minutes         Chief Complaint: Neck Problem    Visit #: 2    SUBJECTIVE:  Patient reports that symptoms have remained the same.     OBJECTIVE:  Current objective measures:           Therapeutic Exercises (CPT 85836):     1. trial supine chin tuck, x5-10 every 4 hours    2. rows with band, x30    3. pulldowns with band, x30    4. postural edu, x5min    5. supine chin tuck, x15    6. cervical rot on occ float, x20    7. cervical flex on occ float, x20    8. supine on 1/2 foam roller, x15      Time-based treatments/modalities:    Physical Therapy Timed Treatment Charges  Therapeutic exercise minutes (CPT 97779): 40 minutes      Pain rating (1-10) before treatment:  4  Pain rating (1-10) after treatment:  4    ASSESSMENT:   Response to treatment: Patient responded fair to therapy with no increase in pain. Patient required multiple cues for UT activiation.     PLAN/RECOMMENDATIONS:   Plan for treatment: therapy treatment to continue next visit.  Planned interventions for next visit: continue with current treatment.

## 2024-02-21 ENCOUNTER — APPOINTMENT (OUTPATIENT)
Dept: PHYSICAL THERAPY | Facility: REHABILITATION | Age: 39
End: 2024-02-21
Payer: MEDICAID

## 2024-02-28 ENCOUNTER — PHYSICAL THERAPY (OUTPATIENT)
Dept: PHYSICAL THERAPY | Facility: REHABILITATION | Age: 39
End: 2024-02-28
Payer: MEDICAID

## 2024-02-28 DIAGNOSIS — R51.9 CHRONIC NONINTRACTABLE HEADACHE, UNSPECIFIED HEADACHE TYPE: ICD-10-CM

## 2024-02-28 DIAGNOSIS — G89.29 CHRONIC NONINTRACTABLE HEADACHE, UNSPECIFIED HEADACHE TYPE: ICD-10-CM

## 2024-02-28 PROCEDURE — 97014 ELECTRIC STIMULATION THERAPY: CPT

## 2024-02-28 PROCEDURE — 97110 THERAPEUTIC EXERCISES: CPT

## 2024-02-28 NOTE — OP THERAPY DAILY TREATMENT
Outpatient Physical Therapy  DAILY TREATMENT     [facility] [place]  [address], [address]  [place] [address]  Phone:  [phone]  Fax:  [phone]    Date: 02/28/2024    Patient: Raquel Hernandez  YOB: 1985  MRN: [id number]     Time Calculation    Start time: 1545  Stop time: 1630 Time Calculation (min): 45 minutes         Chief Complaint: Neck Problem    Visit #: 3    SUBJECTIVE:  Patient reports an increase in HA over the last past week.     OBJECTIVE:  Current objective measures:           Therapeutic Exercises (CPT 75320):     1. trial supine chin tuck, x5-10 every 4 hours    2. rows with band, x30    3. pulldowns with band, x30    4. postural edu, x5min    5. supine chin tuck, x15    6. cervical rot on occ float, x20    7. cervical flex on occ float, x20    8. supine on 1/2 foam roller, x15    9. UBE, 2/2min    10. cervical isometric, 4 way x10    Therapeutic Treatments and Modalities:     1. E Stim Unattended (CPT 24306), IFC with HP x15min 80-150hz    Time-based treatments/modalities:    Physical Therapy Timed Treatment Charges  Therapeutic exercise minutes (CPT 34878): 30 minutes      Pain rating (1-10) before treatment:  4  Pain rating (1-10) after treatment:  2    ASSESSMENT:   Response to treatment: Patient responded well to therapy with a decrease in pain. Plan to decrease intensity of exercise and allow for a more gradual progression.     PLAN/RECOMMENDATIONS:   Plan for treatment: therapy treatment to continue next visit.  Planned interventions for next visit: continue with current treatment.

## 2024-03-06 ENCOUNTER — PHYSICAL THERAPY (OUTPATIENT)
Dept: PHYSICAL THERAPY | Facility: REHABILITATION | Age: 39
End: 2024-03-06
Payer: MEDICAID

## 2024-03-06 DIAGNOSIS — R51.9 CHRONIC NONINTRACTABLE HEADACHE, UNSPECIFIED HEADACHE TYPE: ICD-10-CM

## 2024-03-06 DIAGNOSIS — G89.29 CHRONIC NONINTRACTABLE HEADACHE, UNSPECIFIED HEADACHE TYPE: ICD-10-CM

## 2024-03-06 PROCEDURE — 97014 ELECTRIC STIMULATION THERAPY: CPT

## 2024-03-06 PROCEDURE — 97110 THERAPEUTIC EXERCISES: CPT

## 2024-03-07 ENCOUNTER — OFFICE VISIT (OUTPATIENT)
Dept: CARDIOLOGY | Facility: MEDICAL CENTER | Age: 39
End: 2024-03-07
Attending: INTERNAL MEDICINE
Payer: MEDICAID

## 2024-03-07 VITALS
RESPIRATION RATE: 18 BRPM | DIASTOLIC BLOOD PRESSURE: 66 MMHG | HEART RATE: 87 BPM | OXYGEN SATURATION: 98 % | HEIGHT: 67 IN | WEIGHT: 172 LBS | BODY MASS INDEX: 27 KG/M2 | SYSTOLIC BLOOD PRESSURE: 110 MMHG

## 2024-03-07 DIAGNOSIS — I63.9 CEREBROVASCULAR ACCIDENT (CVA), UNSPECIFIED MECHANISM (HCC): ICD-10-CM

## 2024-03-07 PROCEDURE — 3078F DIAST BP <80 MM HG: CPT | Performed by: INTERNAL MEDICINE

## 2024-03-07 PROCEDURE — 99212 OFFICE O/P EST SF 10 MIN: CPT | Performed by: INTERNAL MEDICINE

## 2024-03-07 PROCEDURE — 99204 OFFICE O/P NEW MOD 45 MIN: CPT | Performed by: INTERNAL MEDICINE

## 2024-03-07 PROCEDURE — 3074F SYST BP LT 130 MM HG: CPT | Performed by: INTERNAL MEDICINE

## 2024-03-07 NOTE — OP THERAPY DAILY TREATMENT
Outpatient Physical Therapy  DAILY TREATMENT     [facility] Outpatient Physical Therapy [place]  [address], [address]  [place] [facility] [address]  Phone:  [phone]  Fax:  [phone]    Date: 03/06/2024    Patient: Raquel Hernandez  YOB: 1985  MRN: [id number]     Time Calculation    Start time: 1545  Stop time: 1630 Time Calculation (min): 45 minutes         Chief Complaint: Neck Problem    Visit #: 4    SUBJECTIVE:  Patient reports that symtoms have been flared today. Notes she has had some family issues and has not been able to do her exercises.     OBJECTIVE:  Current objective measures:           Therapeutic Exercises (CPT 30101):     1. trial supine chin tuck, x5-10 every 4 hours, NT    2. rows with band, x30, NT    3. pulldowns with band, x30, NT    4. postural edu, x5min    5. supine chin tuck, x15, NT    6. cervical rot on occ float, x20, NT    7. cervical flex on occ float, x20, NT    8. supine on 1/2 foam roller, x15, NT    9. UBE, 2/2min    10. cervical isometric, 4 way x10    11. supine on 1/2 foam roller, shoulder flexion, scap protraction, pec stretch, scap depression    Therapeutic Treatments and Modalities:     1. E Stim Unattended (CPT 30571), IFC with HP x15min 80-150hz    Time-based treatments/modalities:    Physical Therapy Timed Treatment Charges  Therapeutic exercise minutes (CPT 21045): 30 minutes      Pain rating (1-10) before treatment:  7  Pain rating (1-10) after treatment:  3    ASSESSMENT:   Response to treatment: Patient responded fair to therapy with a decrease in symptoms. Patient was unable to advance exercise due to increased pain.     PLAN/RECOMMENDATIONS:   Plan for treatment: therapy treatment to continue next visit.  Planned interventions for next visit: continue with current treatment.

## 2024-03-08 ENCOUNTER — APPOINTMENT (OUTPATIENT)
Dept: ADMISSIONS | Facility: MEDICAL CENTER | Age: 39
End: 2024-03-08
Attending: INTERNAL MEDICINE
Payer: MEDICAID

## 2024-03-08 ENCOUNTER — TELEPHONE (OUTPATIENT)
Dept: CARDIOLOGY | Facility: MEDICAL CENTER | Age: 39
End: 2024-03-08
Payer: MEDICAID

## 2024-03-08 NOTE — PROGRESS NOTES
"    Interventional cardiology Initial Consultation Note      Chief Complaint: CVA, PFO, atrial septal aneurysm    Gwendolyn Hernandez is a 39 y.o. female  patient presented today to establish care for PFO, CVA.  She was seen by neurology for headache, MRI showed evidence of prior strokes.  She had an echocardiogram which showed atrial septal aneurysm with large grade 3 shunt across the septum with Valsalva.  She is here to discuss her options.        Past Medical History:   Diagnosis Date    Anemia     Gynecological disorder              Current Outpatient Medications   Medication Sig Dispense Refill    valACYclovir (VALTREX) 500 MG Tab Take 1 tablet by mouth once daily 90 Tablet 1    butalbital/apap/caffeine (FIORICET) -40 mg Tab Take 1 Tablet by mouth as needed for Headache.      HYDROcodone-acetaminophen (NORCO) 5-325 MG Tab per tablet Take 1 Tablet by mouth as needed.      propranolol (INDERAL) 10 MG Tab Take 1 Tablet by mouth 3 times a day. 90 Tablet 3    aspirin 81 MG EC tablet Take 1 Tablet by mouth every day. 30 Tablet 5    atorvastatin (LIPITOR) 40 MG Tab Take 1 Tablet by mouth every evening. 30 Tablet 5    doxylamine (UNISOM SLEEPTABS) 25 MG Tab tablet Take 25mg once daily 30 minutes before bedtime as needed 30 Tablet 0    valacyclovir (VALTREX) 1 GM Tab TAKE 1 TABLET BY MOUTH ONCE DAILY FOR 5 DAYS AS NEEDED FOR  HERPES  FLAREUP 30 Tablet 0     No current facility-administered medications for this visit.             Physical Exam:  Ambulatory Vitals  /66 (BP Location: Left arm, Patient Position: Sitting, BP Cuff Size: Adult)   Pulse 87   Resp 18   Ht 1.702 m (5' 7\")   Wt 78 kg (172 lb)   SpO2 98%    Oxygen Therapy:  Pulse Oximetry: 98 %  BP Readings from Last 4 Encounters:   03/07/24 110/66   02/12/24 110/62   01/10/24 104/72   01/02/24 104/60       Weight/BMI: Body mass index is 26.94 kg/m².  Wt Readings from Last 4 Encounters:   03/07/24 78 kg (172 lb)   02/12/24 80.3 kg (177 lb 0.5 oz) "   02/12/24 77.1 kg (170 lb)   01/10/24 78.8 kg (173 lb 12.8 oz)           General: Well appearing and in no apparent distress  Neck: carotid bruits absent  Lungs: respiratory sounds  normal  Heart: Regular rhythm,  No palpable thrills on palpation, murmurs absent, no rubs,   Lower extremity edema absent.     Echocardiogram 2/18/2024 reviewed, independently interpreted  Atrial septal aneurysm with grade 3 shunt across the septum with Valsalva.    MRI brain 2/1/2024  1.  Several tiny foci of encephalomalacia in the left cerebral hemisphere consistent with old lacunar infarcts or other remote insult.  2.  Otherwise, MRI of the brain without contrast within normal limits.    Medical Decision Making:  Problem List Items Addressed This Visit    None  Visit Diagnoses       Cerebrovascular accident (CVA), unspecified mechanism (HCC)        Relevant Orders    FACTOR V LEIDEN MUTATION    ANTICARDIOLIPIN AB IGG,IGM,IGA    CL-PATENT FORAMEN OVALE CLOSURE          39-year-old female patient with prior multiple tiny old lacunar infarcts suggestive of embolism, high risk PFO with atrial septal aneurysm.  14-day Zio patch was completed, awaiting results.  Will check antiphospholipid antibody, factor V mutation.    She will benefit from PFO closure to prevent future strokes.  Risk benefits of the procedure discussed in detail.  Patient agreeable to proceed .    This note was dictated using Dragon speech recognition software.    Dominic TREVINO  Interventional cardiologist  [facility], [address]  [address] [address]  Phone: [phone]  Fax: [phone]

## 2024-03-08 NOTE — TELEPHONE ENCOUNTER
Patient is scheduled on 4-19-24 for a PFO closure with . No meds to stop and patient to check in at 7:30 for a 9:30 procedure. Updated H&P to be done on admit by NP. Pre admit to call patient. Joe COMBS notified by emailed on 3-8-24. Barbara U/S Rep was notified on 3-8-24 by email.

## 2024-03-08 NOTE — TELEPHONE ENCOUNTER
----- Message from Dominic Erickson M.D. sent at 3/7/2024  4:39 PM PST -----  Please schedule next available PFO closure with [facility] from Joe Diaz from [facility].

## 2024-03-11 ENCOUNTER — APPOINTMENT (OUTPATIENT)
Dept: INTERNAL MEDICINE | Facility: OTHER | Age: 39
End: 2024-03-11
Payer: MEDICAID

## 2024-03-13 ENCOUNTER — PHYSICAL THERAPY (OUTPATIENT)
Dept: PHYSICAL THERAPY | Facility: REHABILITATION | Age: 39
End: 2024-03-13
Payer: MEDICAID

## 2024-03-13 ENCOUNTER — TELEPHONE (OUTPATIENT)
Dept: CARDIOLOGY | Facility: MEDICAL CENTER | Age: 39
End: 2024-03-13

## 2024-03-13 DIAGNOSIS — G89.29 CHRONIC NONINTRACTABLE HEADACHE, UNSPECIFIED HEADACHE TYPE: ICD-10-CM

## 2024-03-13 DIAGNOSIS — R51.9 CHRONIC NONINTRACTABLE HEADACHE, UNSPECIFIED HEADACHE TYPE: ICD-10-CM

## 2024-03-13 PROCEDURE — 97110 THERAPEUTIC EXERCISES: CPT

## 2024-03-13 PROCEDURE — 97014 ELECTRIC STIMULATION THERAPY: CPT

## 2024-03-13 NOTE — TELEPHONE ENCOUNTER
To JI: please read and sign monitor for AK, thank you!     To SC: please advise if needed, thank you!

## 2024-03-14 DIAGNOSIS — I63.9 CEREBROVASCULAR ACCIDENT (CVA), UNSPECIFIED MECHANISM (HCC): ICD-10-CM

## 2024-03-14 PROCEDURE — 93248 EXT ECG>7D<15D REV&INTERPJ: CPT | Performed by: INTERNAL MEDICINE

## 2024-03-14 RX ORDER — PROPRANOLOL HYDROCHLORIDE 10 MG/1
10 TABLET ORAL 3 TIMES DAILY
Qty: 90 TABLET | Status: CANCELLED | OUTPATIENT
Start: 2024-03-14

## 2024-03-14 NOTE — TELEPHONE ENCOUNTER
Received request via: Pharmacy    Medication Name/Dosage propranolol (INDERAL) 10 MG Tab     When was medication last prescribed 02/12/24    How many refills were previously provided 3    How many Refills does he patient have left from last prescription 2    Was the patient seen in the last year in this department? Yes   Date of last office visit 02/12/24     Per last Neurology Office Visit, when was the date of next follow up visit set for?                            Date of office visit follow up request 2 months     Does the patient have an upcoming appointment? Yes   If yes, when 04/02/24             If no, schedule appointment N/A    Does the patient have [facility] and need 100 day supply (blood pressure, diabetes and cholesterol meds only)? Patient does not have SCP

## 2024-03-14 NOTE — OP THERAPY DAILY TREATMENT
Outpatient Physical Therapy  DAILY TREATMENT     [facility] Outpatient Physical Therapy [place]  [address], [address]  [place] [address]  Phone:  [phone]  Fax:  [phone]    Date: 03/13/2024    Patient: Raquel Hernandez  YOB: 1985  MRN: [id number]     Time Calculation    Start time: 1545  Stop time: 1630 Time Calculation (min): 45 minutes         Chief Complaint: Neck Problem    Visit #: 5    SUBJECTIVE:  Patient reports little to no pain or HA. States she took an edible and is feeling great.     OBJECTIVE:  Current objective measures:           Therapeutic Exercises (CPT 40592):     1. trial supine chin tuck, x5-10 every 4 hours, NT    2. rows with band, x30, NT    3. pulldowns with band, x30, NT    4. postural edu, x5min    5. supine chin tuck, x15, NT    6. cervical rot on occ float, x20, NT    7. cervical flex on occ float, x20, NT    8. supine on 1/2 foam roller, x15, NT    9. UBE, 2/2min    10. cervical isometric, NT    11. supine on 1/2 foam roller, NT, NT, NT, NT    15. TRX, row/wide row, x20    Therapeutic Treatments and Modalities:     1. E Stim Unattended (CPT 93050), IFC with HP x15min 80-150hz    Time-based treatments/modalities:    Physical Therapy Timed Treatment Charges  Therapeutic exercise minutes (CPT 57152): 30 minutes      Pain rating (1-10) before treatment:  1  Pain rating (1-10) after treatment:  0    ASSESSMENT:   Response to treatment: Patient reported minor tightness that resolved post IFC. Plan to continue to advance postural stability exercise.     PLAN/RECOMMENDATIONS:   Plan for treatment: therapy treatment to continue next visit.  Planned interventions for next visit: continue with current treatment.

## 2024-03-15 ENCOUNTER — OFFICE VISIT (OUTPATIENT)
Dept: INTERNAL MEDICINE | Facility: OTHER | Age: 39
End: 2024-03-15
Payer: MEDICAID

## 2024-03-15 VITALS
OXYGEN SATURATION: 96 % | SYSTOLIC BLOOD PRESSURE: 96 MMHG | DIASTOLIC BLOOD PRESSURE: 65 MMHG | WEIGHT: 178 LBS | HEART RATE: 79 BPM | HEIGHT: 67 IN | TEMPERATURE: 98.2 F | BODY MASS INDEX: 27.94 KG/M2

## 2024-03-15 DIAGNOSIS — F41.9 ANXIETY: ICD-10-CM

## 2024-03-15 DIAGNOSIS — R00.2 PALPITATIONS: ICD-10-CM

## 2024-03-15 DIAGNOSIS — G47.00 INSOMNIA, UNSPECIFIED TYPE: ICD-10-CM

## 2024-03-15 DIAGNOSIS — K62.5 BRIGHT RED BLOOD PER RECTUM: ICD-10-CM

## 2024-03-15 DIAGNOSIS — K64.8 INTERNAL HEMORRHOIDS: ICD-10-CM

## 2024-03-15 DIAGNOSIS — R55 SYNCOPE, UNSPECIFIED SYNCOPE TYPE: Primary | ICD-10-CM

## 2024-03-15 PROCEDURE — 3074F SYST BP LT 130 MM HG: CPT | Mod: GC

## 2024-03-15 PROCEDURE — 3078F DIAST BP <80 MM HG: CPT | Mod: GC

## 2024-03-15 PROCEDURE — 99214 OFFICE O/P EST MOD 30 MIN: CPT | Mod: GC

## 2024-03-15 RX ORDER — HYDROXYZINE HYDROCHLORIDE 25 MG/1
25 TABLET, FILM COATED ORAL 4 TIMES DAILY PRN
Qty: 120 TABLET | Refills: 1 | Status: SHIPPED | OUTPATIENT
Start: 2024-03-15 | End: 2024-03-25

## 2024-03-15 ASSESSMENT — ENCOUNTER SYMPTOMS
DEPRESSION: 0
BLOOD IN STOOL: 1
FEVER: 0
EYES NEGATIVE: 1
DIZZINESS: 1
SENSORY CHANGE: 1
CHILLS: 0
NERVOUS/ANXIOUS: 1
COUGH: 0
LOSS OF CONSCIOUSNESS: 1
SHORTNESS OF BREATH: 1
PALPITATIONS: 1
WEIGHT LOSS: 0
CONSTIPATION: 1
NAUSEA: 1

## 2024-03-15 NOTE — PROGRESS NOTES
"    Established Patient    Patient Care Team:  Karime Fair D.O. as PCP - General (Internal Medicine)  Jeremiah Verma, PT, DPT as Physical Therapist (Physical Therapy)    HPI:  Gwendolyn Hernandez is a 39 y.o. female with relevant past medical history of CVA, chronic headaches, insomnia who presents today due to increased anxiety, shortness of breath, palpitations, and difficulty sleeping. She has a history of CVA of unknown date found while working up similar symptoms. Echo demonstrated PFO and her anxiety has increased since discussing with her cardiologist surgery to repair the abnormality.    She describes her symptoms as chest tightness, palpitations, and lightheadedness. Happens throughout the day but mostly when she is juggling multiple tasks or trying to get her son to do his homework. She takes propanolol TID prescribed by her neurologist for heart rate.  This does not help with her anxiety symptoms.  She is unclear whether or not this is strictly anxiety or if there is a cardiology component to her symptoms.    This most recent Sunday, she had 2 syncopal episodes about 10 minutes apart.  She states she was getting out of the shower when she suddenly became very lightheaded with palpitations.  She sat down in a chair and at some point her partner came into the room and found her lethargic, nonverbal, but aware of who he was and what was going on.  At some point, she lost consciousness in front of him and he could not feel a pulse review seconds.  After about a minute, she woke up but was still lethargic.  She did not seem confused.  Roughly 10 minutes later, she was able to verbalize \"up\" so her partner assisted her to stand up.  At that point, she lost consciousness again for a few seconds.  Her partner provided some Gatorade and a muffin which seemed to help her alertness.  Patient reports that she had a very similar episode 3 years ago witnessed by her son.    She has chronically low blood pressure.  " Today's blood pressure was 96/65.  When reviewing results from Zio patch, she had an episode of bradycardia of 33 late on 2/22 at which point patient said she was still awake.  There are some patient reported symptoms at that time.  No dangerous arrhythmias noted.  Mostly sinus rhythm to sinus tachycardia.  Orthostatics performed in office were negative.    She does not have a menstrual cycle due to hysterectomy.  She does have bright red blood per rectum open mostly on toilet paper and she has known hemorrhoids.  No large-volume blood loss.    Finally, we discussed her difficulty falling asleep.  She states that before falling asleep, she has racing thoughts and then after a couple of hours of sleep she wakes up and continues to have these racing thoughts.      Review of Systems   Constitutional:  Positive for malaise/fatigue. Negative for chills, fever and weight loss.   HENT: Negative.     Eyes: Negative.    Respiratory:  Positive for shortness of breath. Negative for cough.    Cardiovascular:  Positive for chest pain and palpitations.   Gastrointestinal:  Positive for blood in stool, constipation and nausea. Negative for melena.   Genitourinary: Negative.    Skin: Negative.    Neurological:  Positive for dizziness, sensory change and loss of consciousness.   Psychiatric/Behavioral:  Negative for depression. The patient is nervous/anxious.    :    Past Medical History:   Diagnosis Date    Anemia     Gynecological disorder      Social History     Tobacco Use    Smoking status: Never    Smokeless tobacco: Never   Vaping Use    Vaping Use: Every day    Substances: CBD, Started vaping for a week to try to calm anxiety   Substance Use Topics    Alcohol use: Not Currently     Comment: maybe 3 times a year    Drug use: Not Currently     Types: Marijuana     Comment: tried edibles for pain in Sept     Current Outpatient Medications   Medication Sig Dispense Refill    multivitamin Tab Take 1 Tablet by mouth every day.    "   hydrOXYzine HCl (ATARAX) 25 MG Tab Take 1 Tablet by mouth 4 times a day as needed for Anxiety (anxiety and insomnia). 120 Tablet 1    valACYclovir (VALTREX) 500 MG Tab Take 1 tablet by mouth once daily 90 Tablet 1    butalbital/apap/caffeine (FIORICET) -40 mg Tab Take 1 Tablet by mouth as needed for Headache.      propranolol (INDERAL) 10 MG Tab Take 1 Tablet by mouth 3 times a day. 90 Tablet 3    aspirin 81 MG EC tablet Take 1 Tablet by mouth every day. 30 Tablet 5    atorvastatin (LIPITOR) 40 MG Tab Take 1 Tablet by mouth every evening. 30 Tablet 5    valacyclovir (VALTREX) 1 GM Tab TAKE 1 TABLET BY MOUTH ONCE DAILY FOR 5 DAYS AS NEEDED FOR  HERPES  FLAREUP 30 Tablet 0     No current facility-administered medications for this visit.       Physical Exam:  BP 96/65 (BP Location: Right arm, Patient Position: Sitting, BP Cuff Size: Adult)   Pulse 79   Temp 36.8 °C (98.2 °F) (Temporal)   Ht 1.702 m (5' 7\")   Wt 80.7 kg (178 lb)   LMP 04/27/2015   SpO2 96%   BMI 27.88 kg/m²   Physical Exam  Constitutional:       Appearance: Normal appearance.   Cardiovascular:      Rate and Rhythm: Normal rate and regular rhythm.      Pulses: Normal pulses.      Heart sounds: Normal heart sounds.   Pulmonary:      Effort: Pulmonary effort is normal.      Breath sounds: Normal breath sounds.   Abdominal:      General: Abdomen is flat. Bowel sounds are normal.      Palpations: Abdomen is soft.   Skin:     General: Skin is warm and dry.   Neurological:      General: No focal deficit present.      Mental Status: She is alert and oriented to person, place, and time.   Psychiatric:         Mood and Affect: Mood is anxious.         Behavior: Behavior normal.         Thought Content: Thought content normal.         Judgment: Judgment normal.       Assessment and Plan:   #Syncopal episodes  #Symptomatic bradycardia  Brief episodes of consciousness about 10 minutes apart.  Reports similar episode about 3 years ago.  After " reviewing Zio patch results differential includes symptomatic bradycardia, hypovolemia, vasovagal, or other.  Although orthostatics negative today, concern remains that it could be due to orthostatic hypotension as her blood pressure is already fairly low and she is on propranolol. Denies alcohol use.  - Reach out to cardiology regarding symptoms  - Advised patient to remain hydrated  - CBC ordered    #Anxiety  #Palpitations  #Chest pain  #Insomnia  Suspect that patient's symptoms both from anxiety regarding upcoming surgery as well as physiological input (i.e. racing heart).  The mental and physical components may be exacerbating each other.  She reports racing thoughts at night when not occupied by tasks throughout the day.  Patient and her partner did not report significant anxiety prior to all these health changes.  - Hydroxyzine 25 mg 4 times daily as needed for panic/agitation symptoms and to assist with better sleep    #Bright red blood per rectum  #Hemorrhoids  Patient reports bright red blood when wiping and some in the toilet bowl not intermixed with stool.  She is aware that she has hemorrhoids and suspect this may be the culprit for bright blood.  - CBC  - Recommend discussing at future appointments regarding constipation and symptom relief    Paula Phillips M.D., PGY-1 Internal Medicine  [facility]    This note was created using voice recognition software.  While every attempt is made to ensure accuracy of transcription, occasionally errors occur.

## 2024-03-15 NOTE — PATIENT INSTRUCTIONS
Try hydroxyzine 25mg four times a day as needed for panic-like symptoms and at night to assist with sleeping.    Stay hydrated in hopes of preventing another episode of passing out.

## 2024-03-18 ENCOUNTER — HOSPITAL ENCOUNTER (OUTPATIENT)
Dept: LAB | Facility: MEDICAL CENTER | Age: 39
End: 2024-03-18
Attending: INTERNAL MEDICINE
Payer: MEDICAID

## 2024-03-18 ENCOUNTER — HOSPITAL ENCOUNTER (OUTPATIENT)
Dept: LAB | Facility: MEDICAL CENTER | Age: 39
End: 2024-03-18
Payer: MEDICAID

## 2024-03-18 ENCOUNTER — TELEPHONE (OUTPATIENT)
Dept: INTERNAL MEDICINE | Facility: OTHER | Age: 39
End: 2024-03-18
Payer: MEDICAID

## 2024-03-18 DIAGNOSIS — R55 SYNCOPE, UNSPECIFIED SYNCOPE TYPE: ICD-10-CM

## 2024-03-18 DIAGNOSIS — I63.9 CEREBROVASCULAR ACCIDENT (CVA), UNSPECIFIED MECHANISM (HCC): ICD-10-CM

## 2024-03-18 DIAGNOSIS — K62.5 BRIGHT RED BLOOD PER RECTUM: ICD-10-CM

## 2024-03-18 LAB
BASOPHILS # BLD AUTO: 0.8 % (ref 0–1.8)
BASOPHILS # BLD: 0.07 K/UL (ref 0–0.12)
EOSINOPHIL # BLD AUTO: 0.13 K/UL (ref 0–0.51)
EOSINOPHIL NFR BLD: 1.6 % (ref 0–6.9)
ERYTHROCYTE [DISTWIDTH] IN BLOOD BY AUTOMATED COUNT: 42.1 FL (ref 35.9–50)
HCT VFR BLD AUTO: 41.4 % (ref 37–47)
HGB BLD-MCNC: 13.2 G/DL (ref 12–16)
IMM GRANULOCYTES # BLD AUTO: 0.02 K/UL (ref 0–0.11)
IMM GRANULOCYTES NFR BLD AUTO: 0.2 % (ref 0–0.9)
LYMPHOCYTES # BLD AUTO: 2.43 K/UL (ref 1–4.8)
LYMPHOCYTES NFR BLD: 29.5 % (ref 22–41)
MCH RBC QN AUTO: 30.6 PG (ref 27–33)
MCHC RBC AUTO-ENTMCNC: 31.9 G/DL (ref 32.2–35.5)
MCV RBC AUTO: 95.8 FL (ref 81.4–97.8)
MONOCYTES # BLD AUTO: 0.48 K/UL (ref 0–0.85)
MONOCYTES NFR BLD AUTO: 5.8 % (ref 0–13.4)
NEUTROPHILS # BLD AUTO: 5.11 K/UL (ref 1.82–7.42)
NEUTROPHILS NFR BLD: 62.1 % (ref 44–72)
NRBC # BLD AUTO: 0 K/UL
NRBC BLD-RTO: 0 /100 WBC (ref 0–0.2)
PLATELET # BLD AUTO: 337 K/UL (ref 164–446)
PMV BLD AUTO: 11.8 FL (ref 9–12.9)
RBC # BLD AUTO: 4.32 M/UL (ref 4.2–5.4)
WBC # BLD AUTO: 8.2 K/UL (ref 4.8–10.8)

## 2024-03-18 PROCEDURE — 81241 F5 GENE: CPT

## 2024-03-18 PROCEDURE — 36415 COLL VENOUS BLD VENIPUNCTURE: CPT

## 2024-03-18 PROCEDURE — 86147 CARDIOLIPIN ANTIBODY EA IG: CPT

## 2024-03-18 PROCEDURE — 85025 COMPLETE CBC W/AUTO DIFF WBC: CPT

## 2024-03-18 NOTE — TELEPHONE ENCOUNTER
"Called patient to let her know that her cardiologist recommended the following:  \"I think we should get rid of propranolol and see how she does.   I would just have her increase salt intake and water intake.\"    She expressed understanding and I have discontinued the propanolol prescription.  "

## 2024-03-18 NOTE — TELEPHONE ENCOUNTER
Original prescription was discontinued bt Dr. Paula Phillips today. Reason not stated. Shavon Arias MA.

## 2024-03-20 LAB
CARDIOLIPIN IGA SER IA-ACNC: <10 APL
CARDIOLIPIN IGG SER IA-ACNC: <10 GPL
CARDIOLIPIN IGM SER IA-ACNC: <10 MPL

## 2024-03-22 LAB — F5 P.R506Q BLD/T QL: NEGATIVE

## 2024-03-25 ENCOUNTER — OFFICE VISIT (OUTPATIENT)
Dept: INTERNAL MEDICINE | Facility: OTHER | Age: 39
End: 2024-03-25
Payer: MEDICAID

## 2024-03-25 ENCOUNTER — TELEPHONE (OUTPATIENT)
Dept: NEUROLOGY | Facility: MEDICAL CENTER | Age: 39
End: 2024-03-25

## 2024-03-25 VITALS
HEIGHT: 67 IN | DIASTOLIC BLOOD PRESSURE: 70 MMHG | HEART RATE: 96 BPM | SYSTOLIC BLOOD PRESSURE: 102 MMHG | TEMPERATURE: 98.8 F | OXYGEN SATURATION: 95 % | WEIGHT: 179 LBS | BODY MASS INDEX: 28.09 KG/M2

## 2024-03-25 DIAGNOSIS — F41.9 ANXIETY: ICD-10-CM

## 2024-03-25 DIAGNOSIS — G47.00 INSOMNIA, UNSPECIFIED TYPE: ICD-10-CM

## 2024-03-25 PROCEDURE — 99213 OFFICE O/P EST LOW 20 MIN: CPT | Mod: GE

## 2024-03-25 PROCEDURE — 3074F SYST BP LT 130 MM HG: CPT | Mod: GC

## 2024-03-25 PROCEDURE — 3078F DIAST BP <80 MM HG: CPT | Mod: GC

## 2024-03-25 RX ORDER — DOXEPIN HYDROCHLORIDE 25 MG/1
25 CAPSULE ORAL NIGHTLY
Qty: 30 CAPSULE | Refills: 1 | Status: SHIPPED | OUTPATIENT
Start: 2024-03-25

## 2024-03-25 ASSESSMENT — ANXIETY QUESTIONNAIRES
IF YOU CHECKED OFF ANY PROBLEMS ON THIS QUESTIONNAIRE, HOW DIFFICULT HAVE THESE PROBLEMS MADE IT FOR YOU TO DO YOUR WORK, TAKE CARE OF THINGS AT HOME, OR GET ALONG WITH OTHER PEOPLE: EXTREMELY DIFFICULT
2. NOT BEING ABLE TO STOP OR CONTROL WORRYING: NEARLY EVERY DAY
6. BECOMING EASILY ANNOYED OR IRRITABLE: NEARLY EVERY DAY
GAD7 TOTAL SCORE: 19
1. FEELING NERVOUS, ANXIOUS, OR ON EDGE: NEARLY EVERY DAY
7. FEELING AFRAID AS IF SOMETHING AWFUL MIGHT HAPPEN: SEVERAL DAYS
3. WORRYING TOO MUCH ABOUT DIFFERENT THINGS: NEARLY EVERY DAY
4. TROUBLE RELAXING: NEARLY EVERY DAY
5. BEING SO RESTLESS THAT IT IS HARD TO SIT STILL: NEARLY EVERY DAY

## 2024-03-25 ASSESSMENT — FIBROSIS 4 INDEX: FIB4 SCORE: 0.4

## 2024-03-25 ASSESSMENT — PATIENT HEALTH QUESTIONNAIRE - PHQ9: CLINICAL INTERPRETATION OF PHQ2 SCORE: 2
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
## 2024-03-26 ASSESSMENT — ENCOUNTER SYMPTOMS
ABDOMINAL PAIN: 0
CHILLS: 0
PALPITATIONS: 0
INSOMNIA: 1
VOMITING: 0
HEADACHES: 1
FEVER: 0
NAUSEA: 0

## 2024-03-29 ENCOUNTER — PRE-ADMISSION TESTING (OUTPATIENT)
Dept: ADMISSIONS | Facility: MEDICAL CENTER | Age: 39
End: 2024-03-29
Attending: INTERNAL MEDICINE
Payer: MEDICAID

## 2024-03-29 NOTE — OR NURSING
Pre-admit RN tele appointment completed. Instructed pt to continue all medications, as prescribed, per MD order.

## 2024-04-02 ENCOUNTER — TELEPHONE (OUTPATIENT)
Dept: PHARMACY | Facility: MEDICAL CENTER | Age: 39
End: 2024-04-02

## 2024-04-02 ENCOUNTER — OFFICE VISIT (OUTPATIENT)
Dept: NEUROLOGY | Facility: MEDICAL CENTER | Age: 39
End: 2024-04-02
Attending: PSYCHIATRY & NEUROLOGY
Payer: MEDICAID

## 2024-04-02 ENCOUNTER — PATIENT MESSAGE (OUTPATIENT)
Dept: INTERNAL MEDICINE | Facility: OTHER | Age: 39
End: 2024-04-02

## 2024-04-02 VITALS
BODY MASS INDEX: 28.25 KG/M2 | RESPIRATION RATE: 18 BRPM | HEIGHT: 67 IN | WEIGHT: 180 LBS | SYSTOLIC BLOOD PRESSURE: 102 MMHG | HEART RATE: 93 BPM | TEMPERATURE: 97.6 F | OXYGEN SATURATION: 97 % | DIASTOLIC BLOOD PRESSURE: 74 MMHG

## 2024-04-02 DIAGNOSIS — G89.29 CHRONIC NONINTRACTABLE HEADACHE, UNSPECIFIED HEADACHE TYPE: ICD-10-CM

## 2024-04-02 DIAGNOSIS — R51.9 CHRONIC NONINTRACTABLE HEADACHE, UNSPECIFIED HEADACHE TYPE: ICD-10-CM

## 2024-04-02 PROCEDURE — 3078F DIAST BP <80 MM HG: CPT | Performed by: PSYCHIATRY & NEUROLOGY

## 2024-04-02 PROCEDURE — 99212 OFFICE O/P EST SF 10 MIN: CPT | Performed by: PSYCHIATRY & NEUROLOGY

## 2024-04-02 PROCEDURE — 3074F SYST BP LT 130 MM HG: CPT | Performed by: PSYCHIATRY & NEUROLOGY

## 2024-04-02 PROCEDURE — 99214 OFFICE O/P EST MOD 30 MIN: CPT | Performed by: PSYCHIATRY & NEUROLOGY

## 2024-04-02 RX ORDER — ERENUMAB-AOOE 140 MG/ML
140 INJECTION, SOLUTION SUBCUTANEOUS
Qty: 1.12 ML | Refills: 5 | Status: SHIPPED | OUTPATIENT
Start: 2024-04-02 | End: 2024-04-12

## 2024-04-02 ASSESSMENT — FIBROSIS 4 INDEX: FIB4 SCORE: 0.4

## 2024-04-02 ASSESSMENT — PATIENT HEALTH QUESTIONNAIRE - PHQ9: CLINICAL INTERPRETATION OF PHQ2 SCORE: 0

## 2024-04-02 NOTE — TELEPHONE ENCOUNTER
Prior Authorization for AIMOVIG SOLUTION AUTO-INJECTOR 140 MG/ML has been approved for a quantity of 1 , day supply 30    Prior Authorization reference number: [id number]  Insurance: [place]// [place]  Effective dates: 04/02/2024 THRU 09/29/2024  Copay: $0.00     Is patient eligible to fill with [facility]? Yes    Next Steps: The Patients copay is less than $5.00. Will contact the patient to determine choice of pharmacy, if applicable.

## 2024-04-02 NOTE — PATIENT INSTRUCTIONS
Please keep the diary of your headaches.    Please keep the diary of provoking factors for your headaches (e.g. alcohol, certain foods, etc).     Please take riboflavin (vitamin B2) 400 mg daily and magnesium oxide 400 mg daily for headache prevention (over-the-counter).    Please take Aimovig 140 mg injection every month (please start it a month after cardiac surgery). Please note that Aimovig may cause life-threatening allergic reactions, as well as constipation.     Please let our office know if you experience any changes in your neurological status and/or any changes in the nature of your headaches.     Please seek emergent medical attention if you experience any new/worsening neck/back pain, weakness/numbness, bladder/bowel incontinence, balance/walking difficulties, and/or any new/worsening neurological problem(s).     Please take aspirin 81 mg and atorvastatin 40 mg daily for stroke prevention.    Please follow up with cardiology and hematology for evaluation of stroke causes.    Please obtain MR thoracic spine to evaluate for cause of your neck pain. Please follow up with urology for urinary incontinence.
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
## 2024-04-02 NOTE — TELEPHONE ENCOUNTER
Prior Authorization for AIMOVIG SOLUTION AUTO-INJECTOR 140 MG/ML (Quantity: 1, Days: 30) has been submitted via Cover My Meds: Key [id number])    Insurance: [place]//[facility]    Will follow up in 24-48 business hours.

## 2024-04-12 ENCOUNTER — HOSPITAL ENCOUNTER (EMERGENCY)
Facility: MEDICAL CENTER | Age: 39
End: 2024-04-12
Payer: MEDICAID

## 2024-04-12 ENCOUNTER — OFFICE VISIT (OUTPATIENT)
Dept: URGENT CARE | Facility: CLINIC | Age: 39
End: 2024-04-12
Payer: MEDICAID

## 2024-04-12 VITALS
OXYGEN SATURATION: 100 % | TEMPERATURE: 98.2 F | RESPIRATION RATE: 18 BRPM | HEART RATE: 88 BPM | DIASTOLIC BLOOD PRESSURE: 75 MMHG | SYSTOLIC BLOOD PRESSURE: 113 MMHG

## 2024-04-12 VITALS
SYSTOLIC BLOOD PRESSURE: 114 MMHG | TEMPERATURE: 97.3 F | RESPIRATION RATE: 12 BRPM | DIASTOLIC BLOOD PRESSURE: 68 MMHG | HEART RATE: 86 BPM | OXYGEN SATURATION: 97 % | WEIGHT: 176.59 LBS | BODY MASS INDEX: 27.72 KG/M2 | HEIGHT: 67 IN

## 2024-04-12 DIAGNOSIS — R09.81 NASAL CONGESTION: ICD-10-CM

## 2024-04-12 DIAGNOSIS — R20.0 RIGHT ARM NUMBNESS: ICD-10-CM

## 2024-04-12 PROCEDURE — 3074F SYST BP LT 130 MM HG: CPT

## 2024-04-12 PROCEDURE — 302449 STATCHG TRIAGE ONLY (STATISTIC)

## 2024-04-12 PROCEDURE — 99215 OFFICE O/P EST HI 40 MIN: CPT

## 2024-04-12 PROCEDURE — 3078F DIAST BP <80 MM HG: CPT

## 2024-04-12 ASSESSMENT — ENCOUNTER SYMPTOMS
TREMORS: 0
SINUS PAIN: 0
VERTIGO: 0
NUMBNESS: 1
VOMITING: 0
DIARRHEA: 0
SENSORY CHANGE: 1
EXTREMITY NUMBNESS: 1
MYALGIAS: 0
LOSS OF CONSCIOUSNESS: 0
FEVER: 0
DIZZINESS: 0
VISUAL CHANGE: 0
SORE THROAT: 1
SPEECH CHANGE: 0
WEAKNESS: 0
CHILLS: 0
COUGH: 1
SEIZURES: 0
HEADACHES: 0
NAUSEA: 0
SHORTNESS OF BREATH: 0
FATIGUE: 0
ABDOMINAL PAIN: 0
FALLS: 0
FOCAL WEAKNESS: 0
TINGLING: 0

## 2024-04-12 ASSESSMENT — FIBROSIS 4 INDEX: FIB4 SCORE: 0.4

## 2024-04-12 NOTE — PROGRESS NOTES
"Subjective:   Gwendolyn Hernandez is a 39 y.o. female who presents for Other (R arm num off and on x 12 hrs), Nasal Congestion, and Pharyngitis      Numbness  This is a new problem. Episode onset: x12 hours. The problem has been unchanged. Associated symptoms include congestion, coughing, numbness and a sore throat. Pertinent negatives include no abdominal pain, chest pain, chills, fatigue, fever, headaches, myalgias, nausea, rash, vertigo, visual change, vomiting or weakness.       Review of Systems   Constitutional:  Negative for chills, fatigue, fever and malaise/fatigue.   HENT:  Positive for congestion and sore throat. Negative for ear pain, hearing loss and sinus pain.    Respiratory:  Positive for cough. Negative for shortness of breath.    Cardiovascular:  Negative for chest pain.   Gastrointestinal:  Negative for abdominal pain, diarrhea, nausea and vomiting.   Genitourinary:  Negative for dysuria.   Musculoskeletal:  Negative for falls and myalgias.   Skin:  Negative for rash.   Neurological:  Positive for sensory change and numbness. Negative for dizziness, vertigo, tingling, tremors, speech change, focal weakness, seizures, loss of consciousness, weakness and headaches.       Medications, Allergies, and current problem list reviewed today in Epic.     Objective:     /68   Pulse 86   Temp 36.3 °C (97.3 °F) (Temporal)   Resp 12   Ht 1.702 m (5' 7\")   Wt 80.1 kg (176 lb 9.4 oz)   SpO2 97%     Physical Exam  Constitutional:       General: She is not in acute distress.     Appearance: Normal appearance. She is not ill-appearing.   HENT:      Head: Normocephalic and atraumatic.      Right Ear: Tympanic membrane, ear canal and external ear normal.      Left Ear: Tympanic membrane, ear canal and external ear normal.      Nose: Congestion present. No rhinorrhea.      Mouth/Throat:      Mouth: Mucous membranes are moist.      Pharynx: No oropharyngeal exudate or posterior oropharyngeal erythema.   Eyes:    "   Conjunctiva/sclera: Conjunctivae normal.   Cardiovascular:      Rate and Rhythm: Normal rate and regular rhythm.      Heart sounds: Normal heart sounds. No murmur heard.  Pulmonary:      Effort: Pulmonary effort is normal. No respiratory distress.      Breath sounds: Normal breath sounds. No wheezing.   Abdominal:      General: Abdomen is flat.      Palpations: Abdomen is soft.   Musculoskeletal:         General: No signs of injury. Normal range of motion.      Cervical back: Normal range of motion. No rigidity or tenderness.   Lymphadenopathy:      Cervical: No cervical adenopathy.   Skin:     General: Skin is warm and dry.      Capillary Refill: Capillary refill takes less than 2 seconds.   Neurological:      Mental Status: She is alert and oriented to person, place, and time.      Sensory: Sensory deficit present.      Motor: No weakness or abnormal muscle tone.      Coordination: Coordination normal. Finger-Nose-Finger Test normal.      Gait: Gait normal.      Comments: Patient has numbness to right upper extremity from shoulder down for past 12 hours.  Patient has no noted weakness.  Grasp strength is 5 out of 5.  Patient had normal finger-to-nose coordination.  Cap refill is less than 2 seconds.   Psychiatric:         Mood and Affect: Mood normal.         Behavior: Behavior normal.       Assessment/Plan:       1. Right arm numbness        2. Nasal congestion          Patient is a pleasant 39-year-old female who is here today with complaints of right upper extremity numbness for the past 12 hours.  Patient reports that she has had similar symptoms like this in the past but they typically do not last this long.  Patient denies any recent injuries or falls.  Patient reports that she has an upcoming heart surgery and needs to make sure that she is cleared.  Patient does report that she had a CT done in the past that showed that she had previous strokes that she was unaware of.  Patient also has developed over  the last 2 days nasal congestion, cough, and sore throat.  Patient's significant other also has similar symptoms for over a week.  Patient has been using sinus rinses and Sudafed with minimal relief.  Patient denies any fevers, nausea/vomiting, chest pain, shortness of breath, unilateral deficits, altered level of consciousness, or abdominal pain.  After ROS and physical exam patient is alert and oriented and answering questions appropriately.  Patient has no noted weakness to right upper extremity.  Patient reports that numbness starts from shoulder but worsens from elbow distally.  There is no evidence of injury.  Patient has full  strength and able to perform finger-to-nose coordination with no deficits.  Patient did report in office that she started feeling faint and at this time feel that is reasonable to send patient to emergency room for further management and ambulance was called to take patient over and [facility] was contacted about patient arrival.  While awaiting EMS arrival patient is reporting that she is now developing numbness and tingling to left upper extremity and left lower extremity still with no obvious deficits.  Patient on pulse ox and is alert and oriented answering questions at this time.  Heart rate and oxygen is within normal limits.    Differential diagnosis, natural history, and supportive care discussed. We also reviewed side effects of medication including allergic response, GI upset, tendon injury, rash, sedation etc. Patient and/or guardian voices understanding.      Advised the patient to follow-up with the primary care physician for recheck, reevaluation, and consideration of further management.    I personally reviewed prior external notes and test results pertinent to today's visit as well as additional imaging and testing completed in clinic today.     Please note that this dictation was created using voice recognition software. I have made every  reasonable attempt to correct obvious errors, but I expect that there are errors of grammar and possibly content that I did not discover before finalizing the note.    This note was electronically signed by MINH Villarreal

## 2024-04-12 NOTE — ED NOTES
Chief Complaint   Patient presents with    Numbness     right arm since 6pm last night  From elbow to finger tip    Tingling     Bilateral feet and left arm started while she was urgent care     Pt bib ems from Urgent care with above complaints. No facial droop or unilateral weakness noted. Pt said that she is scheduled for heart surgery to repair hole in heart.  
Pt called for room assignment for the second time with no response from lobby or bathroom. Pt to be dismissed LWBS without notifying hospital staff.   
Pt called for room assignment. No response from lobby. Pt to be called again in 5 minutes or dismissed.   
Patent

## 2024-04-15 ENCOUNTER — PRE-ADMISSION TESTING (OUTPATIENT)
Dept: ADMISSIONS | Facility: MEDICAL CENTER | Age: 39
End: 2024-04-15
Attending: INTERNAL MEDICINE
Payer: MEDICAID

## 2024-04-15 DIAGNOSIS — Z01.812 PRE-OPERATIVE LABORATORY EXAMINATION: ICD-10-CM

## 2024-04-15 DIAGNOSIS — Z01.810 PRE-OPERATIVE CARDIOVASCULAR EXAMINATION: ICD-10-CM

## 2024-04-15 LAB
ALBUMIN SERPL BCP-MCNC: 4.4 G/DL (ref 3.2–4.9)
ALBUMIN/GLOB SERPL: 1.5 G/DL
ALP SERPL-CCNC: 76 U/L (ref 30–99)
ALT SERPL-CCNC: 13 U/L (ref 2–50)
ANION GAP SERPL CALC-SCNC: 12 MMOL/L (ref 7–16)
AST SERPL-CCNC: 16 U/L (ref 12–45)
BILIRUB SERPL-MCNC: 0.6 MG/DL (ref 0.1–1.5)
BUN SERPL-MCNC: 11 MG/DL (ref 8–22)
CALCIUM ALBUM COR SERPL-MCNC: 9.2 MG/DL (ref 8.5–10.5)
CALCIUM SERPL-MCNC: 9.5 MG/DL (ref 8.5–10.5)
CHLORIDE SERPL-SCNC: 101 MMOL/L (ref 96–112)
CO2 SERPL-SCNC: 26 MMOL/L (ref 20–33)
CREAT SERPL-MCNC: 0.56 MG/DL (ref 0.5–1.4)
EKG IMPRESSION: NORMAL
ERYTHROCYTE [DISTWIDTH] IN BLOOD BY AUTOMATED COUNT: 41.5 FL (ref 35.9–50)
GFR SERPLBLD CREATININE-BSD FMLA CKD-EPI: 118 ML/MIN/1.73 M 2
GLOBULIN SER CALC-MCNC: 2.9 G/DL (ref 1.9–3.5)
GLUCOSE SERPL-MCNC: 115 MG/DL (ref 65–99)
HCT VFR BLD AUTO: 43.7 % (ref 37–47)
HGB BLD-MCNC: 14.3 G/DL (ref 12–16)
INR PPP: 1.04 (ref 0.87–1.13)
MCH RBC QN AUTO: 30.5 PG (ref 27–33)
MCHC RBC AUTO-ENTMCNC: 32.7 G/DL (ref 32.2–35.5)
MCV RBC AUTO: 93.2 FL (ref 81.4–97.8)
PLATELET # BLD AUTO: 405 K/UL (ref 164–446)
PMV BLD AUTO: 11 FL (ref 9–12.9)
POTASSIUM SERPL-SCNC: 4.1 MMOL/L (ref 3.6–5.5)
PROT SERPL-MCNC: 7.3 G/DL (ref 6–8.2)
PROTHROMBIN TIME: 13.8 SEC (ref 12–14.6)
RBC # BLD AUTO: 4.69 M/UL (ref 4.2–5.4)
SODIUM SERPL-SCNC: 139 MMOL/L (ref 135–145)
WBC # BLD AUTO: 9.8 K/UL (ref 4.8–10.8)

## 2024-04-15 PROCEDURE — 85027 COMPLETE CBC AUTOMATED: CPT

## 2024-04-15 PROCEDURE — 93010 ELECTROCARDIOGRAM REPORT: CPT | Performed by: INTERNAL MEDICINE

## 2024-04-15 PROCEDURE — 93005 ELECTROCARDIOGRAM TRACING: CPT

## 2024-04-15 PROCEDURE — 85610 PROTHROMBIN TIME: CPT

## 2024-04-15 PROCEDURE — 80053 COMPREHEN METABOLIC PANEL: CPT

## 2024-04-15 PROCEDURE — 36415 COLL VENOUS BLD VENIPUNCTURE: CPT

## 2024-04-18 ASSESSMENT — ENCOUNTER SYMPTOMS
ORTHOPNEA: 0
PALPITATIONS: 0
LOSS OF CONSCIOUSNESS: 0
SHORTNESS OF BREATH: 0
DIZZINESS: 0

## 2024-04-18 NOTE — H&P
History:  Primary Diagnosis: PFO, history of CVA, atrial septal aneurysm     HPI:  Raquel Hernandez patient of Dr. Erickson with significant history of PFO.  Patient seen by neurology for headache MRI showed evidence of prior strokes, echocardiogram showed atrial septal aneurysm with large grade 3 shunt.  Plan for PFO closure    Currently patient denies chest pain, shortness of breath or palpitations. Patient does not complain of symptoms.     Medical history:   Past Medical History:   Diagnosis Date    Anemia     Gynecological disorder     Heart murmur     High cholesterol     PONV (postoperative nausea and vomiting)     Stroke (HCC)        Surgical history:   Past Surgical History:   Procedure Laterality Date    HYSTERECTOMY ROBOTIC Bilateral 05/06/2016    Procedure: HYSTERECTOMY ROBOTIC WITH MODESTA SALPINGECTOMY ;  Surgeon: Samantha Barnes M.D.;  Location: SURGERY SAME DAY [facility];  Service:     OTHER  03/01/2016    left shoulder lump removed    BREAST RECONSTRUCTION      CHOLECYSTECTOMY      OTHER  4-1-2016 teeth extraction       Social history:   Social History     Tobacco Use   Smoking Status Never   Smokeless Tobacco Never      Social History     Substance and Sexual Activity   Alcohol Use Not Currently    Comment: maybe 3 times a year      Social History     Substance and Sexual Activity   Drug Use Not Currently    Types: Marijuana    Comment: tried edibles for pain in Sept        Family history:   Family History   Problem Relation Age of Onset    Genitourinary () Problems Mother     Cancer Father        Allergies:   Allergies   Allergen Reactions    Amitriptyline Swelling    Trazodone Shortness of Breath    Amoxicillin Hives       Home medications: No current facility-administered medications for this encounter.    Current Outpatient Medications:     doxepin (SINEQUAN) 25 MG Cap, Take 1 Capsule by mouth every evening., Disp: 30 Capsule, Rfl: 1    multivitamin Tab, Take 1 Tablet by mouth every day., Disp: , Rfl:      valACYclovir (VALTREX) 500 MG Tab, Take 1 tablet by mouth once daily, Disp: 90 Tablet, Rfl: 1    aspirin 81 MG EC tablet, Take 1 Tablet by mouth every day., Disp: 30 Tablet, Rfl: 5    valacyclovir (VALTREX) 1 GM Tab, TAKE 1 TABLET BY MOUTH ONCE DAILY FOR 5 DAYS AS NEEDED FOR  HERPES  FLAREUP, Disp: 30 Tablet, Rfl: 0    Review of Systems:  Review of Systems   Respiratory:  Negative for shortness of breath.    Cardiovascular:  Negative for chest pain, palpitations, orthopnea and leg swelling.   Neurological:  Negative for dizziness and loss of consciousness.   All other systems reviewed and are negative.      Physical Examination:  Physical Exam  Vitals reviewed.   Constitutional:       General: She is not in acute distress.     Appearance: She is normal weight.   Neck:      Thyroid: No thyroid mass.      Vascular: No JVD.   Cardiovascular:      Rate and Rhythm: Regular rhythm.      Pulses: Normal pulses.      Heart sounds: Normal heart sounds.   Pulmonary:      Effort: Pulmonary effort is normal. No respiratory distress.      Breath sounds: Normal breath sounds.   Abdominal:      General: Abdomen is flat. There is no distension.      Tenderness: There is no abdominal tenderness.   Musculoskeletal:      Cervical back: Full passive range of motion without pain.      Right lower leg: No edema.      Left lower leg: No edema.   Neurological:      General: No focal deficit present.      Mental Status: She is alert.   Psychiatric:         Attention and Perception: Attention normal.         Speech: Speech normal.         Behavior: Behavior normal. Behavior is cooperative.         Impression:  PFO with atrial septal aneurysm, shunt and history of CVA    Plan:  Plan for PFO closure with Dr. Erickson     The risks, benefits, and alternatives to coronary angiography with IV sedation were discussed in great detail. Specific risks mentioned include bleeding, infection, kidney damage, allergic reaction, cardiac perforation  with possible tamponade requiring pericardiocentesis or possibly open heart surgery. In addition, we discussed that 10% of patients will experience small to moderate bruising at the site of the arterial puncture. Lastly, the risks of heart attack, stroke and death were discussed; the risk of major complications such as heart attack or stroke caused by the angiogram is approximately 1%; the risk of death is approximately 1 in 1000. The patient verbalized understanding of the potential complications and wishes to proceed with this procedure.    The risks/benefits of the procedure will be further d    Hanh De Los Santos, MSN, [facility]  [phone]    Please note this dictation was created using voice recognition software.  I have made every reasonable attempt to correct obvious errors, but there may be errors of grammar and possibly content that I did not discover before finalizing the note. iscussed with the consenting physician performing the procedure.

## 2024-04-19 ENCOUNTER — HOSPITAL ENCOUNTER (OUTPATIENT)
Facility: MEDICAL CENTER | Age: 39
End: 2024-04-19
Attending: INTERNAL MEDICINE | Admitting: INTERNAL MEDICINE
Payer: MEDICAID

## 2024-04-19 ENCOUNTER — APPOINTMENT (OUTPATIENT)
Dept: CARDIOLOGY | Facility: MEDICAL CENTER | Age: 39
End: 2024-04-19
Attending: INTERNAL MEDICINE
Payer: MEDICAID

## 2024-04-19 VITALS
WEIGHT: 174.6 LBS | HEIGHT: 67 IN | RESPIRATION RATE: 18 BRPM | DIASTOLIC BLOOD PRESSURE: 67 MMHG | BODY MASS INDEX: 27.4 KG/M2 | TEMPERATURE: 98.1 F | OXYGEN SATURATION: 94 % | SYSTOLIC BLOOD PRESSURE: 104 MMHG | HEART RATE: 85 BPM

## 2024-04-19 DIAGNOSIS — I63.9 CEREBROVASCULAR ACCIDENT (CVA), UNSPECIFIED MECHANISM (HCC): ICD-10-CM

## 2024-04-19 LAB — ACT BLD: 217 SEC (ref 74–137)

## 2024-04-19 PROCEDURE — 700101 HCHG RX REV CODE 250: Mod: UD

## 2024-04-19 PROCEDURE — 160035 HCHG PACU - 1ST 60 MINS PHASE I

## 2024-04-19 PROCEDURE — 700111 HCHG RX REV CODE 636 W/ 250 OVERRIDE (IP): Mod: JZ,UD

## 2024-04-19 PROCEDURE — A9270 NON-COVERED ITEM OR SERVICE: HCPCS | Mod: UD

## 2024-04-19 PROCEDURE — 99152 MOD SED SAME PHYS/QHP 5/>YRS: CPT | Performed by: INTERNAL MEDICINE

## 2024-04-19 PROCEDURE — 99153 MOD SED SAME PHYS/QHP EA: CPT

## 2024-04-19 PROCEDURE — 700102 HCHG RX REV CODE 250 W/ 637 OVERRIDE(OP): Mod: UD

## 2024-04-19 PROCEDURE — 160002 HCHG RECOVERY MINUTES (STAT)

## 2024-04-19 PROCEDURE — 85347 COAGULATION TIME ACTIVATED: CPT

## 2024-04-19 PROCEDURE — 160036 HCHG PACU - EA ADDL 30 MINS PHASE I

## 2024-04-19 PROCEDURE — 93662 INTRACARDIAC ECG (ICE): CPT | Mod: 26 | Performed by: INTERNAL MEDICINE

## 2024-04-19 PROCEDURE — 160046 HCHG PACU - 1ST 60 MINS PHASE II

## 2024-04-19 PROCEDURE — 93580 TRANSCATH CLOSURE OF ASD: CPT | Performed by: INTERNAL MEDICINE

## 2024-04-19 RX ORDER — ONDANSETRON 2 MG/ML
INJECTION INTRAMUSCULAR; INTRAVENOUS
Status: COMPLETED
Start: 2024-04-19 | End: 2024-04-19

## 2024-04-19 RX ORDER — CEFAZOLIN SODIUM 1 G/3ML
INJECTION, POWDER, FOR SOLUTION INTRAMUSCULAR; INTRAVENOUS
Status: COMPLETED
Start: 2024-04-19 | End: 2024-04-19

## 2024-04-19 RX ORDER — HEPARIN SODIUM 1000 [USP'U]/ML
INJECTION, SOLUTION INTRAVENOUS; SUBCUTANEOUS
Status: COMPLETED
Start: 2024-04-19 | End: 2024-04-19

## 2024-04-19 RX ORDER — MIDAZOLAM HYDROCHLORIDE 1 MG/ML
INJECTION INTRAMUSCULAR; INTRAVENOUS
Status: COMPLETED
Start: 2024-04-19 | End: 2024-04-19

## 2024-04-19 RX ORDER — LIDOCAINE HYDROCHLORIDE 20 MG/ML
INJECTION, SOLUTION INFILTRATION; PERINEURAL
Status: COMPLETED
Start: 2024-04-19 | End: 2024-04-19

## 2024-04-19 RX ORDER — DIPHENHYDRAMINE HYDROCHLORIDE 50 MG/ML
INJECTION INTRAMUSCULAR; INTRAVENOUS
Status: COMPLETED
Start: 2024-04-19 | End: 2024-04-19

## 2024-04-19 RX ORDER — ASPIRIN 81 MG/1
TABLET, CHEWABLE ORAL
Status: COMPLETED
Start: 2024-04-19 | End: 2024-04-19

## 2024-04-19 RX ORDER — CLOPIDOGREL 300 MG/1
TABLET, FILM COATED ORAL
Status: COMPLETED
Start: 2024-04-19 | End: 2024-04-19

## 2024-04-19 RX ORDER — SODIUM CHLORIDE, SODIUM LACTATE, POTASSIUM CHLORIDE, CALCIUM CHLORIDE 600; 310; 30; 20 MG/100ML; MG/100ML; MG/100ML; MG/100ML
INJECTION, SOLUTION INTRAVENOUS CONTINUOUS
Status: ACTIVE | OUTPATIENT
Start: 2024-04-19 | End: 2024-04-19

## 2024-04-19 RX ORDER — CLOPIDOGREL BISULFATE 75 MG/1
75 TABLET ORAL DAILY
Qty: 90 TABLET | Refills: 1 | Status: SHIPPED | OUTPATIENT
Start: 2024-04-19

## 2024-04-19 RX ADMIN — CEFAZOLIN 2000 MG: 1 INJECTION, POWDER, FOR SOLUTION INTRAMUSCULAR; INTRAVENOUS at 09:27

## 2024-04-19 RX ADMIN — MIDAZOLAM HYDROCHLORIDE 2 MG: 2 INJECTION, SOLUTION INTRAMUSCULAR; INTRAVENOUS at 09:30

## 2024-04-19 RX ADMIN — HEPARIN SODIUM: 1000 INJECTION, SOLUTION INTRAVENOUS; SUBCUTANEOUS at 09:27

## 2024-04-19 RX ADMIN — CLOPIDOGREL BISULFATE 600 MG: 300 TABLET, FILM COATED ORAL at 10:15

## 2024-04-19 RX ADMIN — DIPHENHYDRAMINE HYDROCHLORIDE 25 MG: 50 INJECTION, SOLUTION INTRAMUSCULAR; INTRAVENOUS at 09:46

## 2024-04-19 RX ADMIN — FENTANYL CITRATE 100 MCG: 50 INJECTION, SOLUTION INTRAMUSCULAR; INTRAVENOUS at 09:30

## 2024-04-19 RX ADMIN — HEPARIN SODIUM: 1000 INJECTION, SOLUTION INTRAVENOUS; SUBCUTANEOUS at 10:00

## 2024-04-19 RX ADMIN — MIDAZOLAM HYDROCHLORIDE 2 MG: 2 INJECTION, SOLUTION INTRAMUSCULAR; INTRAVENOUS at 09:47

## 2024-04-19 RX ADMIN — LIDOCAINE HYDROCHLORIDE: 20 INJECTION, SOLUTION INFILTRATION; PERINEURAL at 09:27

## 2024-04-19 RX ADMIN — ASPIRIN 81 MG: 81 TABLET, CHEWABLE ORAL at 09:00

## 2024-04-19 RX ADMIN — MIDAZOLAM HYDROCHLORIDE 2 MG: 2 INJECTION, SOLUTION INTRAMUSCULAR; INTRAVENOUS at 09:55

## 2024-04-19 RX ADMIN — ONDANSETRON 4 MG: 2 INJECTION INTRAMUSCULAR; INTRAVENOUS at 09:28

## 2024-04-19 ASSESSMENT — PAIN DESCRIPTION - PAIN TYPE
TYPE: SURGICAL PAIN

## 2024-04-19 ASSESSMENT — FIBROSIS 4 INDEX: FIB4 SCORE: 0.44

## 2024-04-19 NOTE — DISCHARGE INSTRUCTIONS
You had PFO closure today.  Do not lift more than 10 pounds for 3-4 days.  If you notice bleeding in groin, rest and apply pressure, it should stop.  Some bruising is normal in the groin area.  Start taking aspirin 81 mg daily- this you will take indefinitely  Start taking clopidogrel 75 mg daily, this you will take for 180 days.  You will need to take antibiotics prior to dental cleaning for next 6 months, dentist usually prescribe this if they don't contact our office, we can send prescription.   Some people may feel heart racing or skipping a beat after this procedure for few weeks, this usually gets better with time.      Transcatheter Closure of Patent Foramen Ovale, Adult, Care After  The following information offers guidance on how to care for yourself after your procedure. Your health care provider may also give you more specific instructions. If you have problems or questions, contact your health care provider.  What can I expect after the procedure?  After the procedure, it is common to have:  Swelling and tenderness in the area where the catheter was inserted.  Bruising.  Follow these instructions at home:  Medicines  Take over-the-counter and prescription medicines only as told by your health care provider.  You may need to take medicines to prevent blood clots for 6 months or longer. You may have to take aspirin and clopidogrel. Clopidogrel is a blood thinner (anticoagulant) that helps to prevent heart attacks and strokes.  Ask your health care provider if you need to take antibiotic medicine before dental procedures and surgeries. This may be necessary to prevent infection.  Bleeding precautions  If you are taking blood thinners:  Talk with your health care provider before you take any medicines that contain aspirin or NSAIDs, such as ibuprofen. These medicines increase your risk for dangerous bleeding.  Take your medicine exactly as told, at the same time every day.  Wear a medical alert bracelet or  carry a card that lists what medicines you take.  Avoid activities that could cause injury or bruising:  Prevent falls by removing loose rugs and extension cords from areas where you walk.  Be very careful when using knives, scissors, or other sharp objects.  Do not play contact sports or participate in other activities that have a high risk of injury.  Care of the catheter insertion site    Follow instructions from your health care provider about how to take care of your catheter insertion site. Make sure you:  Wash your hands with soap and water for at least 20 seconds before and after you change your bandage (dressing). If soap and water are not available, use hand .  Change your dressing as told by your health care provider.  Leave stitches (sutures), skin glue, or adhesive strips in place. These skin closures may need to stay in place for 2 weeks or longer. If adhesive strip edges start to loosen and curl up, you may trim the loose edges. Do not remove adhesive strips completely unless your health care provider tells you to do that. Do not peel away any skin glue. This will fall off on its own.  Check your catheter insertion site every day for signs of infection. Check for:  More redness, swelling, or pain.  Fluid or blood.  Warmth.  A lump or bump.  Pus or a bad smell.  Activity  Do not lift anything that is heavier than 10 lb (4.5 kg), or the limit that you are told, until your health care provider says that it is safe.  If you were given a sedative during the procedure, it can affect you for several hours. Do not drive or operate machinery until your health care provider says that it is safe.  Do not take baths, swim, or use a hot tub until your health care provider approves. Ask your health care provider if you may take showers. You may only be allowed to take sponge baths.  Return to your normal activities as told by your health care provider. Ask your health care provider what activities are safe  for you.  Lifestyle  Avoid drinking alcohol.  Do not use any products that contain nicotine or tobacco. These products include cigarettes, chewing tobacco, and vaping devices, such as e-cigarettes. If you need help quitting, ask your health care provider.  General instructions  Drink enough fluid to keep your urine pale yellow. This helps to get rid of the dye that was used during the procedure.  Tell all health care providers and dental care providers who care for you that you had a patent foramen ovale closure. Do this before having any type of test or surgery.  Wear compression stockings as told by your health care provider. These stockings help to prevent blood clots and reduce swelling in your legs.  Keep all follow-up visits. This is important.  Contact a health care provider if:  You have pain that does not get better with medicine.  You have pain or swelling in your leg.  You have a fever.  You have signs of infection in the area where the catheter was inserted. These include:  More redness, swelling, or pain around the insertion site.  Fluid or blood coming from the insertion site.  Warmth when you touch the insertion site.  A hard lump or bump at the insertion site.  Pus or a bad smell coming from the insertion site.  Get help right away if:  You have trouble breathing.  You have chest pain.  You have severe pain in your arm or jaw.  These symptoms may represent a serious problem that is an emergency. Do not wait to see if the symptoms will go away. Get medical help right away. Call your local emergency services (911 in the [place] Do not drive yourself to the hospital.  Summary  After the procedure, it is common to have some bruising, swelling, and tenderness where the catheter was inserted into your inner thigh or groin area.  Check your catheter insertion site every day for signs of infection, such as more redness, swelling, or pain.  Before any procedure or test, tell all health care providers and  dental providers who care for you that you had a patent foramen ovale closure.  This information is not intended to replace advice given to you by your health care provider. Make sure you discuss any questions you have with your health care provider.  Document Revised: 10/21/2021 Document Reviewed: 10/21/2021  Elsevier Patient Education © 2023 [employer]

## 2024-04-19 NOTE — PROCEDURES
Patent foramen ovale closure    Indication: Cryptogenic stroke with patent foramen ovale    Procedures performed:  1.  Right common femoral vein access  2.  Intracardiac echocardiogram  3.  Successful closure of PFO using 30 mm Morrison cardioform device     Procedure in detail:  Patient was explained risks and benefits of PFO closure.  Patient was brought to cardiac catheterization laboratory.  Both groins were prepped and patient was draped in sterile fashion.  Right femoral vein access was obtained with 8 fr sheath for ice probe insertion.  Second access was obtained with 11 fr sheath.  Patient was given heparin and therapeutic ACT was confirmed. Multiplanar imaging was performed using intracardiac echocardiogram and confirmed patent foramen ovale.  Then we used 4 German multipurpose catheter and crossed PFO with 0.35 J-wire and placed in left upper pulmonary vein.  This wire was exchanged with stiff Amplatz wire and multipurpose catheter was removed. Aortic and SVC rims were measured after crossing with stiff wire to make device size selection.  A 30 mm Morrison cardioform device was prepped in usual fashion.  Delivery system was advanced over an Amplatz stiff wire, then wire was removed.  Device was deployed in usual fashion, left disc on the left side, right disc on the right side.  Imaging was performed and confirmed adequate capture of rims and no residual flow.  Then a tug test was performed to confirm stability of the device.  Device was stable during tug test.  Then device was released in usual fashion.  After releasing imaging was performed and confirmed good positioning.  All catheters and sheaths were removed.  Hemostasis was obtained using manual pressure.    Complications: None  Specimens: None  Estimated blood loss <20 cc.    I supervised moderate sedation over a trained and independent nursing staff.    Sedation start time:09:23 , End time: 10:04    Dominic Smalls  cardiologist.  [facility].

## 2024-04-19 NOTE — OR NURSING
1030 Pt arrived to PACU with cathlab RN. AAOx4. Even, unlabored respirations. VSS. 0 pain. 0 nausea. R groin dressing CDI.    1030 POC update given to jessee Israel at the bedside. All questions answered.     1050 Small hematoma felt at R groin site. Manual pressure held for 5 minutes. R groin now soft, dry.     1100 Pt tolerating PO fluids and snack.     1420 Pt completed bed rest. Pt ambulated to bathroom. Tolerated well. R groin soft, dry post ambulation.    1430 Discharge instructions reviewed with patient and jessee. Confirmed understanding. Pt and fiance will stop at [facility] on way home to  plavix. IV removed.     1441 Pt transported to car with RN. All belongings sent with patient.

## 2024-04-22 ENCOUNTER — APPOINTMENT (OUTPATIENT)
Dept: URGENT CARE | Facility: PHYSICIAN GROUP | Age: 39
End: 2024-04-22
Payer: MEDICAID

## 2024-04-24 ENCOUNTER — OFFICE VISIT (OUTPATIENT)
Dept: INTERNAL MEDICINE | Facility: OTHER | Age: 39
End: 2024-04-24
Payer: MEDICAID

## 2024-04-24 VITALS
HEIGHT: 67 IN | BODY MASS INDEX: 27.34 KG/M2 | WEIGHT: 174.2 LBS | SYSTOLIC BLOOD PRESSURE: 104 MMHG | HEART RATE: 99 BPM | DIASTOLIC BLOOD PRESSURE: 74 MMHG | TEMPERATURE: 97.8 F | OXYGEN SATURATION: 98 %

## 2024-04-24 DIAGNOSIS — J01.80 OTHER SUBACUTE SINUSITIS: ICD-10-CM

## 2024-04-24 DIAGNOSIS — F41.9 ANXIETY: ICD-10-CM

## 2024-04-24 DIAGNOSIS — G47.01 INSOMNIA DUE TO MEDICAL CONDITION: ICD-10-CM

## 2024-04-24 PROBLEM — M47.812 CERVICAL SPONDYLOSIS WITHOUT MYELOPATHY: Status: ACTIVE | Noted: 2024-01-17

## 2024-04-24 PROBLEM — M47.816 ARTHROPATHY OF LUMBAR FACET JOINT: Status: ACTIVE | Noted: 2024-01-17

## 2024-04-24 PROBLEM — G43.709 CHRONIC MIGRAINE WITHOUT AURA: Status: ACTIVE | Noted: 2024-01-17

## 2024-04-24 PROCEDURE — 3074F SYST BP LT 130 MM HG: CPT | Mod: GC

## 2024-04-24 PROCEDURE — 3078F DIAST BP <80 MM HG: CPT | Mod: GC

## 2024-04-24 PROCEDURE — 99214 OFFICE O/P EST MOD 30 MIN: CPT | Mod: GC

## 2024-04-24 RX ORDER — METHOCARBAMOL 500 MG/1
TABLET, FILM COATED ORAL
COMMUNITY
End: 2024-04-24

## 2024-04-24 RX ORDER — DOXYCYCLINE HYCLATE 100 MG
100 TABLET ORAL 2 TIMES DAILY
Qty: 14 TABLET | Refills: 0 | Status: SHIPPED | OUTPATIENT
Start: 2024-04-24 | End: 2024-05-01

## 2024-04-24 RX ORDER — CHOLECALCIFEROL (VITAMIN D3) 50 MCG
TABLET ORAL DAILY
COMMUNITY

## 2024-04-24 RX ORDER — LORAZEPAM 1 MG/1
0.5 TABLET ORAL NIGHTLY
Qty: 5 TABLET | Refills: 0 | Status: SHIPPED | OUTPATIENT
Start: 2024-04-24 | End: 2024-05-04

## 2024-04-24 ASSESSMENT — FIBROSIS 4 INDEX: FIB4 SCORE: 0.44

## 2024-04-24 NOTE — PROGRESS NOTES
Established Patient    Patient Care Team:  Karime Fair D.O. as PCP - General (Internal Medicine)  Jeremiah Verma, PT, DPT as Physical Therapist (Physical Therapy)    HPI:  Gwendolyn Hernandez is a 39 y.o. female with relevant past medical history of *** who presents today ***    PFO closure 4/19    About 4 weeks of coughing up green mucous and streaks of blood.          ROS:    Past Medical History:   Diagnosis Date   • Anemia    • Gynecological disorder    • Heart murmur    • High cholesterol    • PONV (postoperative nausea and vomiting)    • Stroke (HCC)      Social History     Tobacco Use   • Smoking status: Never   • Smokeless tobacco: Never   Vaping Use   • Vaping Use: Former   • Substances: CBD, Started vaping for a week to try to calm anxiety   Substance Use Topics   • Alcohol use: Not Currently     Comment: maybe 3 times a year   • Drug use: Not Currently     Types: Marijuana     Comment: tried edibles for pain in Sept     Current Outpatient Medications   Medication Sig Dispense Refill   • Cholecalciferol (VITAMIN D) 2000 UNIT Tab Take  by mouth every day.     • B Complex Vitamins (B COMPLEX 50 PO) Take  by mouth.     • clopidogrel (PLAVIX) 75 MG Tab Take 1 Tablet by mouth every day. 90 Tablet 1   • doxepin (SINEQUAN) 25 MG Cap Take 1 Capsule by mouth every evening. 30 Capsule 1   • valACYclovir (VALTREX) 500 MG Tab Take 1 tablet by mouth once daily 90 Tablet 1   • aspirin 81 MG EC tablet Take 1 Tablet by mouth every day. 30 Tablet 5   • valacyclovir (VALTREX) 1 GM Tab TAKE 1 TABLET BY MOUTH ONCE DAILY FOR 5 DAYS AS NEEDED FOR  HERPES  FLAREUP 30 Tablet 0   • methocarbamol (ROBAXIN) 500 MG Tab TAKE 1 TABLET BY MOUTH EVERY 8 HOURS FOR MUSCLE SPASM AS DIRECTED (Patient not taking: Reported on 4/24/2024)     • multivitamin Tab Take 1 Tablet by mouth every day. (Patient not taking: Reported on 4/24/2024)       No current facility-administered medications for this visit.       Physical Exam:  /74 (BP  "Location: Left arm, Patient Position: Sitting, BP Cuff Size: Adult)   Pulse 99   Temp 36.6 °C (97.8 °F) (Temporal)   Ht 1.702 m (5' 7\")   Wt 79 kg (174 lb 3.2 oz)   LMP 04/27/2015   SpO2 98%   BMI 27.28 kg/m²   Physical Exam      Assessment and Plan:     There are no diagnoses linked to this encounter.     No follow-ups on file.      Paula Phillips M.D., PGY-1 Internal Medicine  [facility]    This note was created using voice recognition software.  While every attempt is made to ensure accuracy of transcription, occasionally errors occur.  "      Pulses: Normal pulses.      Heart sounds: Normal heart sounds.   Pulmonary:      Effort: Pulmonary effort is normal.      Breath sounds: Normal breath sounds.   Abdominal:      General: Abdomen is flat. Bowel sounds are normal.      Palpations: Abdomen is soft.   Musculoskeletal:      Cervical back: Normal range of motion and neck supple. Tenderness present.   Skin:     General: Skin is warm and dry.   Neurological:      General: No focal deficit present.      Mental Status: She is alert and oriented to person, place, and time.   Psychiatric:         Attention and Perception: Attention normal.         Mood and Affect: Mood is anxious.         Speech: Speech normal.         Behavior: Behavior normal. Behavior is cooperative.         Thought Content: Thought content normal.         Cognition and Memory: Cognition and memory normal.         Judgment: Judgment normal.           Assessment and Plan:   Raquel Hernandez is a 38 yo female who presented today for acute issues.    1. Other subacute sinusitis  Onset of congestions, sinus pain, productive cough with green mucus about 4 weeks ago. Denies fevers and chills. Physical exam consistent with acute/subacute sinusitis. Allergy to penicillins.  - doxycycline (VIBRAMYCIN) 100 MG Tab; Take 1 Tablet by mouth 2 times a day for 7 days.  Dispense: 14 Tablet; Refill: 0    2. Insomnia due to medical condition  3. Anxiety  Had discussion with patient regarding importance of addressing root-cause of insomnia. Based on the description of her symptoms (racing thoughts, waking up and unable to fall back asleep, situational stressors), her insomnia likely is driven by her anxiety. Denies snoring. She understands this but would like to try something new regardless due to previously failed trials of medications and worsening of quality of sleep. Agreed to provide very short and low course of lorazepam. It does not appear mirtazapine has been tried in the past; this may be an option for  her if still no relief found.  - Follow up with psychology  - LORazepam (ATIVAN) 1 MG Tab; Take 0.5 Tablets by mouth every evening for 10 days.  Dispense: 5 Tablet; Refill: 0    Return in about 5 weeks (around 5/29/2024). To follow up with PCP Dr. Manjula Phillips M.D., PGY-1 Internal Medicine  [facility]    This note was created using voice recognition software.  While every attempt is made to ensure accuracy of transcription, occasionally errors occur.

## 2024-04-25 ENCOUNTER — TELEPHONE (OUTPATIENT)
Dept: CARDIOLOGY | Facility: MEDICAL CENTER | Age: 39
End: 2024-04-25

## 2024-04-25 ENCOUNTER — OFFICE VISIT (OUTPATIENT)
Dept: CARDIOLOGY | Facility: MEDICAL CENTER | Age: 39
End: 2024-04-25
Attending: NURSE PRACTITIONER
Payer: MEDICAID

## 2024-04-25 VITALS
OXYGEN SATURATION: 97 % | HEIGHT: 67 IN | DIASTOLIC BLOOD PRESSURE: 62 MMHG | BODY MASS INDEX: 27.47 KG/M2 | SYSTOLIC BLOOD PRESSURE: 100 MMHG | HEART RATE: 93 BPM | WEIGHT: 175 LBS

## 2024-04-25 DIAGNOSIS — Z87.74 S/P PATENT FORAMEN OVALE CLOSURE: ICD-10-CM

## 2024-04-25 DIAGNOSIS — I63.49 CEREBROVASCULAR ACCIDENT (CVA) DUE TO EMBOLISM OF OTHER CEREBRAL ARTERY (HCC): ICD-10-CM

## 2024-04-25 PROBLEM — I63.9 CEREBROVASCULAR ACCIDENT (CVA) DUE TO EMBOLISM (HCC): Status: ACTIVE | Noted: 2024-04-25

## 2024-04-25 PROCEDURE — 99212 OFFICE O/P EST SF 10 MIN: CPT | Performed by: NURSE PRACTITIONER

## 2024-04-25 ASSESSMENT — ENCOUNTER SYMPTOMS
DEPRESSION: 1
SHORTNESS OF BREATH: 1
PALPITATIONS: 1
ORTHOPNEA: 0
FEVER: 0
ABDOMINAL PAIN: 0
COUGH: 0
DIZZINESS: 1
PND: 0
CLAUDICATION: 0
MYALGIAS: 1

## 2024-04-25 ASSESSMENT — FIBROSIS 4 INDEX: FIB4 SCORE: 0.44

## 2024-04-25 NOTE — TELEPHONE ENCOUNTER
SC  Caller: Gwendolyn Hernandez    Topic/issue: Patient would like to ask if she is able to soak in a tub or hot tub. She is wondering specifically about being submerged. Please call back to advise.    Callback Number: [phone]    Thank you,  Petrona CASTELLANO

## 2024-04-25 NOTE — PROGRESS NOTES
Chief Complaint   Patient presents with    Other     F/V Dx: Cerebrovascular accident (CVA), unspecified mechanism (HCC)     Subjective     Raquel Hernandez is a 39 y.o. female who presents today for post PFO closure follow up.    She is a patient of Dr. Erickson in our office. Hx of S/P PFO closure,    Past Medical History:   Diagnosis Date    Anemia     Gynecological disorder     Heart murmur     High cholesterol     PONV (postoperative nausea and vomiting)     Stroke (HCC)      Past Surgical History:   Procedure Laterality Date    HYSTERECTOMY ROBOTIC Bilateral 05/06/2016    Procedure: HYSTERECTOMY ROBOTIC WITH MODESTA SALPINGECTOMY ;  Surgeon: Samantha Barnes M.D.;  Location: SURGERY SAME DAY [facility];  Service:     OTHER  03/01/2016    left shoulder lump removed    BREAST RECONSTRUCTION      CHOLECYSTECTOMY      OTHER  4-1-2016 teeth extraction     Family History   Problem Relation Age of Onset    Genitourinary () Problems Mother     Cancer Father      Social History     Socioeconomic History    Marital status:      Spouse name: Not on file    Number of children: Not on file    Years of education: Not on file    Highest education level: Not on file   Occupational History    Not on file   Tobacco Use    Smoking status: Never    Smokeless tobacco: Never   Vaping Use    Vaping Use: Former    Substances: CBD, Started vaping for a week to try to calm anxiety   Substance and Sexual Activity    Alcohol use: Not Currently     Comment: maybe 3 times a year    Drug use: Not Currently     Types: Marijuana     Comment: tried edibles for pain in Sept    Sexual activity: Yes     Partners: Male   Other Topics Concern    Not on file   Social History Narrative    Not on file     Social Determinants of Health     Financial Resource Strain: Not on file   Food Insecurity: Not on file   Transportation Needs: Not on file   Physical Activity: Not on file   Stress: Not on file   Social Connections: Not on file   Intimate Partner  Violence: Not on file   Housing Stability: Not on file     Allergies   Allergen Reactions    Amitriptyline Swelling    Trazodone Shortness of Breath    Amoxicillin Hives     Outpatient Encounter Medications as of 4/25/2024   Medication Sig Dispense Refill    Cholecalciferol (VITAMIN D) 2000 UNIT Tab Take  by mouth every day.      B Complex Vitamins (B COMPLEX 50 PO) Take  by mouth.      LORazepam (ATIVAN) 1 MG Tab Take 0.5 Tablets by mouth every evening for 10 days. 5 Tablet 0    doxycycline (VIBRAMYCIN) 100 MG Tab Take 1 Tablet by mouth 2 times a day for 7 days. 14 Tablet 0    clopidogrel (PLAVIX) 75 MG Tab Take 1 Tablet by mouth every day. 90 Tablet 1    valACYclovir (VALTREX) 500 MG Tab Take 1 tablet by mouth once daily 90 Tablet 1    aspirin 81 MG EC tablet Take 1 Tablet by mouth every day. 30 Tablet 5    valacyclovir (VALTREX) 1 GM Tab TAKE 1 TABLET BY MOUTH ONCE DAILY FOR 5 DAYS AS NEEDED FOR  HERPES  FLAREUP 30 Tablet 0    [DISCONTINUED] methocarbamol (ROBAXIN) 500 MG Tab TAKE 1 TABLET BY MOUTH EVERY 8 HOURS FOR MUSCLE SPASM AS DIRECTED (Patient not taking: Reported on 4/24/2024)      [DISCONTINUED] doxepin (SINEQUAN) 25 MG Cap Take 1 Capsule by mouth every evening. (Patient not taking: Reported on 4/25/2024) 30 Capsule 1    [DISCONTINUED] multivitamin Tab Take 1 Tablet by mouth every day. (Patient not taking: Reported on 4/24/2024)       No facility-administered encounter medications on file as of 4/25/2024.     Review of Systems   Constitutional:  Positive for malaise/fatigue. Negative for fever.   Respiratory:  Positive for shortness of breath. Negative for cough.    Cardiovascular:  Positive for chest pain, palpitations and leg swelling. Negative for orthopnea, claudication and PND.   Gastrointestinal:  Negative for abdominal pain.   Musculoskeletal:  Positive for myalgias.   Neurological:  Positive for dizziness.   Psychiatric/Behavioral:  Positive for depression.               Objective     /62  "(BP Location: Left arm, Patient Position: Sitting, BP Cuff Size: Adult)   Pulse 93   Ht 1.702 m (5' 7\")   Wt 79.4 kg (175 lb)   LMP 04/27/2015   SpO2 97%   BMI 27.41 kg/m²     Physical Exam  Vitals and nursing note reviewed.   Constitutional:       Appearance: Normal appearance. She is well-developed and normal weight.   HENT:      Head: Normocephalic and atraumatic.   Neck:      Vascular: No JVD.   Cardiovascular:      Rate and Rhythm: Normal rate and regular rhythm.      Pulses: Normal pulses.      Heart sounds: Normal heart sounds.   Pulmonary:      Effort: Pulmonary effort is normal.      Breath sounds: Normal breath sounds.   Musculoskeletal:         General: Normal range of motion.   Skin:     General: Skin is warm and dry.      Capillary Refill: Capillary refill takes less than 2 seconds.   Neurological:      General: No focal deficit present.      Mental Status: She is alert and oriented to person, place, and time. Mental status is at baseline.   Psychiatric:         Mood and Affect: Mood normal.         Behavior: Behavior normal.         Thought Content: Thought content normal.         Judgment: Judgment normal.                Assessment & Plan     1. S/P patent foramen ovale closure  EC-ECHOCARDIOGRAM COMPLETE W/O CONT      2. Cerebrovascular accident (CVA) due to embolism of other cerebral artery (HCC)          Medical Decision Making: Today's Assessment/Status/Plan:      1. CVA with PFO now with closure  -dizziness remains post CVA  -having post-op chest pain and shortness of breath with exertional activity, nothing with positional changes or inspiration  -cont asa lifelong  -cont plavix 6 months  -consider statin?  -follows with neurology  -review with Dr. Erickson once images uploaded  -follow clinically  -consider colchicine for discomfort    Patient is to follow up with Corin KOEHLER in 3 months with echo STAT to review for possible pericarditis/effusion.                      "

## 2024-04-26 ENCOUNTER — HOSPITAL ENCOUNTER (OUTPATIENT)
Dept: CARDIOLOGY | Facility: MEDICAL CENTER | Age: 39
End: 2024-04-26
Attending: NURSE PRACTITIONER
Payer: MEDICAID

## 2024-04-26 DIAGNOSIS — Z87.74 S/P PATENT FORAMEN OVALE CLOSURE: ICD-10-CM

## 2024-04-26 PROCEDURE — 93306 TTE W/DOPPLER COMPLETE: CPT

## 2024-04-26 NOTE — TELEPHONE ENCOUNTER
To SC: s/p PFO 4/19; still having dizziness/SOB/chest pain per visit note 4/25; please advise if soaking/ being submerged in hot tub/bath tub is okay

## 2024-04-27 ENCOUNTER — PATIENT MESSAGE (OUTPATIENT)
Dept: CARDIOLOGY | Facility: MEDICAL CENTER | Age: 39
End: 2024-04-27
Payer: MEDICAID

## 2024-04-27 DIAGNOSIS — Z87.74 S/P PATENT FORAMEN OVALE CLOSURE: ICD-10-CM

## 2024-04-27 DIAGNOSIS — R07.89 DISCOMFORT IN CHEST: ICD-10-CM

## 2024-04-28 LAB
LV EJECT FRACT  99904: 65
LV EJECT FRACT MOD 2C 99903: 56.92
LV EJECT FRACT MOD 4C 99902: 61.75
LV EJECT FRACT MOD BP 99901: 56.96

## 2024-04-28 PROCEDURE — 93306 TTE W/DOPPLER COMPLETE: CPT | Mod: 26 | Performed by: INTERNAL MEDICINE

## 2024-04-29 RX ORDER — COLCHICINE 0.6 MG/1
0.6 TABLET ORAL 2 TIMES DAILY
Qty: 28 TABLET | Refills: 0 | Status: SHIPPED | OUTPATIENT
Start: 2024-04-29 | End: 2024-05-13

## 2024-04-29 NOTE — PATIENT COMMUNICATION
To SC: please review echo and advise on continued SOB, chest tightness, and dizziness; thank you!

## 2024-04-29 NOTE — TELEPHONE ENCOUNTER
DASIA Grimm.  You29 minutes ago (9:32 AM)     Waiting on AK's recommendations on echo. Echo looks normal but he may want to trial colchicine post op for symptoms.    No bath for 2 weeks until incision in groin is closed for bacterial risk. SC     Noted, see MyChart encounter for follow up as well.

## 2024-04-29 NOTE — PATIENT COMMUNICATION
DASIA Grimm.  You32 minutes ago (10:08 AM)     Per AK, okay to start colchicine for symptom relief. Echo looks good with no effusion though.    Colchicine 0.6 mg BID for 2 weeks. If has GI side effects, stop right away. ALEJO Hinds A.P.R.N.37 minutes ago (10:03 AM)     Yes     MINH Grimm  You; Dominic Erickson M.D.41 minutes ago (9:59 AM)     AK,    Post PFO closure Echo showed no effusion. She is having at rest chest pain and shortness of breath.    Do you want to do colchicine for symptom relief?    Although, she had some of these symptoms pre-op too though, so not sure if related to post-op status or not...     Recommendations sent via [employer].

## 2024-05-02 ASSESSMENT — ENCOUNTER SYMPTOMS
NAUSEA: 0
INSOMNIA: 1
VOMITING: 0
DOUBLE VISION: 0
EYE PAIN: 0
BLOOD IN STOOL: 0
SINUS PAIN: 1
BLURRED VISION: 0
CONSTIPATION: 0
DIARRHEA: 0
NERVOUS/ANXIOUS: 1
COUGH: 1
SHORTNESS OF BREATH: 1
FEVER: 0
CARDIOVASCULAR NEGATIVE: 1
ABDOMINAL PAIN: 0
SPUTUM PRODUCTION: 1
CHILLS: 0

## 2024-05-03 ENCOUNTER — TELEPHONE (OUTPATIENT)
Dept: CARDIOLOGY | Facility: MEDICAL CENTER | Age: 39
End: 2024-05-03
Payer: MEDICAID

## 2024-05-03 NOTE — TELEPHONE ENCOUNTER
PA started.    Gwendolyn Hernandez (Key: [id number])  Rx #: [id number]  Need Help? Call us at [phone]  Status  New (Not sent to plan)  Drug  Colchicine 0.6MG tablets  ePA cloud logo  Form  [employer] [employer] Electronic PA Form  Original Claim Info  75

## 2024-05-03 NOTE — TELEPHONE ENCOUNTER
PA sent to plan.    MEETA MADRIGAL (Key: [id number])  PA Case ID #: [id number]  Rx #: [id number]  Need Help? Call us at [phone]  Status  sent iconSent to Plan today  Drug  Colchicine 0.6MG tablets  hospitals cloud logo  Form  [employer] [place] [employer] Electronic PA Form  Original Claim Info  75

## 2024-05-06 ENCOUNTER — OFFICE VISIT (OUTPATIENT)
Dept: INTERNAL MEDICINE | Facility: OTHER | Age: 39
End: 2024-05-06
Payer: MEDICAID

## 2024-05-06 DIAGNOSIS — F41.9 ANXIETY: ICD-10-CM

## 2024-05-06 DIAGNOSIS — G47.01 INSOMNIA DUE TO MEDICAL CONDITION: ICD-10-CM

## 2024-05-06 DIAGNOSIS — R51.9 CHRONIC NONINTRACTABLE HEADACHE, UNSPECIFIED HEADACHE TYPE: ICD-10-CM

## 2024-05-06 DIAGNOSIS — Z87.74 S/P PATENT FORAMEN OVALE CLOSURE: ICD-10-CM

## 2024-05-06 DIAGNOSIS — G89.29 CHRONIC NONINTRACTABLE HEADACHE, UNSPECIFIED HEADACHE TYPE: ICD-10-CM

## 2024-05-06 PROCEDURE — 3074F SYST BP LT 130 MM HG: CPT | Mod: GC

## 2024-05-06 PROCEDURE — 3078F DIAST BP <80 MM HG: CPT | Mod: GC

## 2024-05-06 PROCEDURE — 99214 OFFICE O/P EST MOD 30 MIN: CPT | Mod: GC

## 2024-05-06 RX ORDER — LORAZEPAM 0.5 MG/1
.5-1 TABLET ORAL NIGHTLY PRN
Qty: 20 TABLET | Refills: 0 | Status: SHIPPED | OUTPATIENT
Start: 2024-05-06 | End: 2024-05-20

## 2024-05-06 RX ORDER — VENLAFAXINE HYDROCHLORIDE 37.5 MG/1
CAPSULE, EXTENDED RELEASE ORAL
Qty: 60 CAPSULE | Refills: 0 | Status: SHIPPED | OUTPATIENT
Start: 2024-05-06

## 2024-05-06 ASSESSMENT — FIBROSIS 4 INDEX: FIB4 SCORE: 0.44

## 2024-05-06 NOTE — TELEPHONE ENCOUNTER
Answered additional clinical questions, PA sent back to plan.     MEETA MADRIGAL (Key: [id number])  PA Case ID #: [id number]  Rx #: [id number]  Need Help? Call us at [phone]  Status  sent iconSent to Plan today  Drug  Colchicine 0.6MG tablets  [facility] cloud logo  Form  [employer] [place] [employer] Electronic PA Form  Original Claim Info  75
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
## 2024-05-06 NOTE — PATIENT INSTRUCTIONS
Psychologist- please call to make appt      [facility]  [id number]  [place] [address]  Phone: [phone]

## 2024-05-08 VITALS
HEIGHT: 67 IN | TEMPERATURE: 96.9 F | WEIGHT: 176.4 LBS | HEART RATE: 86 BPM | SYSTOLIC BLOOD PRESSURE: 96 MMHG | OXYGEN SATURATION: 97 % | DIASTOLIC BLOOD PRESSURE: 60 MMHG | BODY MASS INDEX: 27.69 KG/M2

## 2024-05-08 ASSESSMENT — ENCOUNTER SYMPTOMS
HEADACHES: 1
GASTROINTESTINAL NEGATIVE: 1
FEVER: 0
DIZZINESS: 1
NERVOUS/ANXIOUS: 1
NECK PAIN: 1
CHILLS: 0
SHORTNESS OF BREATH: 1

## 2024-05-09 ENCOUNTER — PATIENT MESSAGE (OUTPATIENT)
Dept: INTERNAL MEDICINE | Facility: OTHER | Age: 39
End: 2024-05-09
Payer: MEDICAID

## 2024-05-10 NOTE — PATIENT COMMUNICATION
VOICEMAIL  1. Caller Name: Gwendolyn Hernandez                      Call Back Number: [phone] (home)     2. Message: Pt LVM wanting a follow up regarding a medication reaction - pt is experiencing nausea, headache and nosebleeds - no nosebleed today however. Pt would like advise on how to proceed    3. Patient approves office to leave a detailed voicemail/MyChart message: N\A

## 2024-06-11 ENCOUNTER — APPOINTMENT (OUTPATIENT)
Dept: INTERNAL MEDICINE | Facility: OTHER | Age: 39
End: 2024-06-11
Payer: MEDICAID

## 2024-06-12 ENCOUNTER — OFFICE VISIT (OUTPATIENT)
Dept: INTERNAL MEDICINE | Facility: OTHER | Age: 39
End: 2024-06-12
Payer: MEDICAID

## 2024-06-12 VITALS
BODY MASS INDEX: 27.31 KG/M2 | HEIGHT: 67 IN | TEMPERATURE: 98.2 F | WEIGHT: 174 LBS | HEART RATE: 86 BPM | SYSTOLIC BLOOD PRESSURE: 97 MMHG | DIASTOLIC BLOOD PRESSURE: 64 MMHG | OXYGEN SATURATION: 96 %

## 2024-06-12 DIAGNOSIS — A09 DIARRHEA OF INFECTIOUS ORIGIN: ICD-10-CM

## 2024-06-12 DIAGNOSIS — G47.01 INSOMNIA DUE TO MEDICAL CONDITION: ICD-10-CM

## 2024-06-12 DIAGNOSIS — N89.9 NODULE OF VAGINA: ICD-10-CM

## 2024-06-12 PROCEDURE — 3078F DIAST BP <80 MM HG: CPT | Mod: GC

## 2024-06-12 PROCEDURE — 99214 OFFICE O/P EST MOD 30 MIN: CPT | Mod: GC

## 2024-06-12 PROCEDURE — 3074F SYST BP LT 130 MM HG: CPT | Mod: GC

## 2024-06-12 RX ORDER — BUTALBITAL, ACETAMINOPHEN AND CAFFEINE 50; 325; 40 MG/1; MG/1; MG/1
1 TABLET ORAL EVERY 4 HOURS PRN
COMMUNITY

## 2024-06-12 RX ORDER — QUETIAPINE FUMARATE 25 MG/1
25 TABLET, FILM COATED ORAL NIGHTLY
COMMUNITY
Start: 2024-06-04

## 2024-06-12 ASSESSMENT — FIBROSIS 4 INDEX: FIB4 SCORE: 0.44

## 2024-06-12 NOTE — PROGRESS NOTES
"    Established Patient    Patient Care Team:  Paula Phillips M.D. as PCP - General (Internal Medicine)  Jeremiah Verma, PT, DPT as Physical Therapist (Physical Therapy)    HPI:  Gwendolyn Hernandez is a 39 y.o. female with relevant past medical history of anxiety, PFO s/p closure on 4/19/24, and insomnia who presents today     Seroquel was prescribed by psychiatry for her insomnia although she has not yet started because she was feeling sick for a week. Previous trials of lorazepam and venlafaxine unsuccessful at treating insomnia.    She reports that she started to have diarrhea and vomiting 6 days ago. The vomiting stopped after 3 days. She thinks she caught this stomach bug from her step-kids who had the same symptoms. She is feeling better every day although she is sticking to mostly liquid diet because solid food still induces diarrhea. She states she is staying well hydrated with water and electrolyte drinks.    She wanted to address a nodule in her vulva that she noticed when it became tender after sexual intercourse on Friday of last week. She describes it as if it were a \"BB pellet\" under her skin. It is the same color as the surrounding skin and she has not seen any discharge. Tender to palpation. She has also noticed an increase in the \"potency\" of her vaginal odor. Denies discharge, pruritus, vaginal dyspareunia. She has had a hard time taking Valtrex for last week due to nausea.    Preventative health measures:  Colonoscopy: n/a  Mammogram: consider starting now that she is 39 and significant family history of breast and ovarian cancer  Cervical cancer screen: n/a, hysterectomy  Flu shot: due in fall  Eye exam: last exam 6/2024  Dental exam: 2 years ago    Review of Systems   Constitutional:  Positive for malaise/fatigue. Negative for chills and fever.   HENT: Negative.  Negative for congestion, ear discharge, ear pain and sinus pain.    Eyes:  Negative for blurred vision, double vision and pain. " "  Respiratory: Negative.  Negative for cough, sputum production and shortness of breath.    Cardiovascular: Negative.  Negative for chest pain.   Gastrointestinal:  Positive for diarrhea and vomiting. Negative for abdominal pain, blood in stool, constipation, melena and nausea.   Genitourinary: Negative.         Tender nodule on upper vulva   Psychiatric/Behavioral:  The patient has insomnia.    :    Past Medical History:   Diagnosis Date    Anemia     Gynecological disorder     Heart murmur     High cholesterol     PONV (postoperative nausea and vomiting)     Stroke (HCC)        Social History     Tobacco Use    Smoking status: Never    Smokeless tobacco: Never   Vaping Use    Vaping status: Former    Substances: CBD, Started vaping for a week to try to calm anxiety   Substance Use Topics    Alcohol use: Not Currently     Comment: maybe 3 times a year    Drug use: Not Currently     Types: Marijuana     Comment: tried edibles for pain in Sept       Current Outpatient Medications   Medication Sig Dispense Refill    QUEtiapine (SEROQUEL) 25 MG Tab Take 25 mg by mouth every evening.      clopidogrel (PLAVIX) 75 MG Tab Take 1 Tablet by mouth every day. 90 Tablet 1    valACYclovir (VALTREX) 500 MG Tab Take 1 tablet by mouth once daily 90 Tablet 1    aspirin 81 MG EC tablet Take 1 Tablet by mouth every day. 30 Tablet 5    valacyclovir (VALTREX) 1 GM Tab TAKE 1 TABLET BY MOUTH ONCE DAILY FOR 5 DAYS AS NEEDED FOR  HERPES  FLAREUP 30 Tablet 0    butalbital/apap/caffeine (FIORICET) -40 mg Tab Take 1 Tablet by mouth every four hours as needed.      Cholecalciferol (VITAMIN D) 2000 UNIT Tab Take  by mouth every day.      B Complex Vitamins (B COMPLEX 50 PO) Take  by mouth.       No current facility-administered medications for this visit.       BP 97/64 (BP Location: Left arm, Patient Position: Sitting, BP Cuff Size: Adult)   Pulse 86   Temp 36.8 °C (98.2 °F) (Temporal)   Ht 1.702 m (5' 7\")   Wt 78.9 kg (174 lb)   " LMP 04/27/2015   SpO2 96%   BMI 27.25 kg/m²   Physical Exam  Constitutional:       General: She is not in acute distress.     Appearance: Normal appearance.   HENT:      Head: Normocephalic and atraumatic.      Right Ear: External ear normal.      Left Ear: External ear normal.   Eyes:      General: No scleral icterus.     Extraocular Movements: Extraocular movements intact.      Conjunctiva/sclera: Conjunctivae normal.   Cardiovascular:      Rate and Rhythm: Normal rate.   Pulmonary:      Effort: Pulmonary effort is normal. No respiratory distress.   Abdominal:      General: There is no distension.      Palpations: Abdomen is soft.   Genitourinary:     Labia:         Right: Lesion present.       Comments: Small 2mm lesion just to the right of the apex of vulva near clitoral balbuena. Tender to palpation. Mobile, smooth. No discoloration or erythema. No vaginal discharge or odor.  Musculoskeletal:         General: No swelling or tenderness.      Cervical back: Normal range of motion.   Skin:     General: Skin is warm and dry.   Neurological:      General: No focal deficit present.      Mental Status: She is alert.   Psychiatric:         Mood and Affect: Mood normal.         Behavior: Behavior normal.           Assessment and Plan:     1. Diarrhea of infectious origin  Going on for nearly a week. Same symptoms of vomiting and diarrhea in her step-children. Symptoms are improving although she can not tolerate solid foods yet.  - Continue hydration and rest  - Return to clinic if symptoms worsen  - Basic Metabolic Panel; Future    2. Nodule of vagina  Noticed nodule last week, described as above in physical exam. Nodule appears to be ingrown hair or clogged pore or small cyst. Patient also noted increase in vaginal odor although no odor or discharge noted on exam. Performed swab of vaginal canal for screening of common pathogens.  - Warm compress on nodule daily  - Baths with epsom salt to help dry out nodule.  - VAGINAL  PATHOGENS DNA PANEL; Future    3. Insomnia due to medical condition  - Start quetiapine 25 mg QHS as prescribed by her psychiatrist  - Continue to address contributing factors, ie anxiety/stress    Also discussed overdue vaccines: Tdap, she will obtain at pharmacy. She thinks she may have had the hepatitis B vaccine already.    Return in about 6 months (around 12/12/2024).    Paula Phillips M.D., PGY-1 Internal Medicine  [facility]    This note was created using voice recognition software.  While every attempt is made to ensure accuracy of transcription, occasionally errors occur.

## 2024-06-17 DIAGNOSIS — N89.9 NODULE OF VAGINA: ICD-10-CM

## 2024-06-17 ASSESSMENT — ENCOUNTER SYMPTOMS
SINUS PAIN: 0
CHILLS: 0
NAUSEA: 0
BLOOD IN STOOL: 0
RESPIRATORY NEGATIVE: 1
CONSTIPATION: 0
VOMITING: 1
SPUTUM PRODUCTION: 0
DIARRHEA: 1
INSOMNIA: 1
CARDIOVASCULAR NEGATIVE: 1
ABDOMINAL PAIN: 0
SHORTNESS OF BREATH: 0
FEVER: 0
DOUBLE VISION: 0
BLURRED VISION: 0
EYE PAIN: 0
COUGH: 0

## 2024-06-26 ENCOUNTER — OFFICE VISIT (OUTPATIENT)
Dept: URGENT CARE | Facility: PHYSICIAN GROUP | Age: 39
End: 2024-06-26
Payer: MEDICAID

## 2024-06-26 VITALS
TEMPERATURE: 98.6 F | WEIGHT: 171.4 LBS | OXYGEN SATURATION: 95 % | BODY MASS INDEX: 26.9 KG/M2 | SYSTOLIC BLOOD PRESSURE: 100 MMHG | RESPIRATION RATE: 20 BRPM | DIASTOLIC BLOOD PRESSURE: 62 MMHG | HEART RATE: 86 BPM | HEIGHT: 67 IN

## 2024-06-26 DIAGNOSIS — R19.7 DIARRHEA, UNSPECIFIED TYPE: ICD-10-CM

## 2024-06-26 DIAGNOSIS — K64.9 HEMORRHOIDS, UNSPECIFIED HEMORRHOID TYPE: ICD-10-CM

## 2024-06-26 PROCEDURE — 3078F DIAST BP <80 MM HG: CPT | Performed by: PHYSICIAN ASSISTANT

## 2024-06-26 PROCEDURE — 99214 OFFICE O/P EST MOD 30 MIN: CPT | Performed by: PHYSICIAN ASSISTANT

## 2024-06-26 PROCEDURE — 3074F SYST BP LT 130 MM HG: CPT | Performed by: PHYSICIAN ASSISTANT

## 2024-06-26 RX ORDER — LOPERAMIDE HYDROCHLORIDE 2 MG/1
2 CAPSULE ORAL 4 TIMES DAILY PRN
Qty: 30 CAPSULE | Refills: 0 | Status: SHIPPED | OUTPATIENT
Start: 2024-06-26

## 2024-06-26 RX ORDER — HYDROCORTISONE 25 MG/G
1 CREAM TOPICAL 2 TIMES DAILY
Qty: 28 G | Refills: 0 | Status: SHIPPED | OUTPATIENT
Start: 2024-06-26

## 2024-06-26 ASSESSMENT — ENCOUNTER SYMPTOMS
FEVER: 0
DIARRHEA: 1
CONSTIPATION: 0
VOMITING: 0
NAUSEA: 1
BLOOD IN STOOL: 0
ABDOMINAL PAIN: 0
CHILLS: 0

## 2024-06-26 ASSESSMENT — FIBROSIS 4 INDEX: FIB4 SCORE: 0.44

## 2024-06-26 NOTE — PROGRESS NOTES
Subjective:   Gwendolyn Hernandez is a 39 y.o. female who presents for Diarrhea (X 5 days pt sts she hasn't been eating because everything she eats causes it to be worse, pt sts its extremely watery. Pt sts it has made hemorrhoids her worse and she is bleeding unable to use cream due to pain  )        Patient presents with concerns of diarrhea for the last 5 days.  Symptoms largely unchanged since initial onset.  Patient is having numerous bowel movements a day and feels that they are precipitated by oral intake.  She has had 2 bowel movements today thus far.  Stools are liquid and brown and yellow.  No melena or hematochezia.  Patient states that she feels mildly nauseous today due to not eating much, but denies fevers, chills, vomiting.  She denies abdominal pain and urinary symptoms.  No one at home has similar symptoms.  She has not tried any medications for symptoms.  Patient has dogs and ferrets at home and her neighbor has chickens.  States that she has not been around the chickens recently.  Past surgical history significant for cholecystectomy and partial hysterectomy.  Denies starting new medications prior to onset of symptoms.  Denies recent antibiotic use.  No recent travel or suspect water intake.    Patient also mentions that hemorrhoids have been flaring up since the diarrhea began.  They are occasionally bleeding and the over-the-counter hemorrhoid medication causes burning.      Review of Systems   Constitutional:  Negative for chills and fever.   Gastrointestinal:  Positive for diarrhea and nausea. Negative for abdominal pain, blood in stool, constipation, melena and vomiting.   Genitourinary: Negative.        PMH:  has a past medical history of Anemia, Gynecological disorder, Heart murmur, High cholesterol, PONV (postoperative nausea and vomiting), and Stroke (HCC).    She has no past medical history of Blood transfusion without reported diagnosis or Cushings syndrome (HCC).  MEDS:   Current  Outpatient Medications:     loperamide (IMODIUM) 2 MG Cap, Take 1 Capsule by mouth 4 times a day as needed for Diarrhea., Disp: 30 Capsule, Rfl: 0    hydrocortisone rectal (PERIANAL) 2.5% Cream, Insert 1 Units into the rectum 2 times a day., Disp: 28 g, Rfl: 0    QUEtiapine (SEROQUEL) 25 MG Tab, Take 25 mg by mouth every evening., Disp: , Rfl:     butalbital/apap/caffeine (FIORICET) -40 mg Tab, Take 1 Tablet by mouth every four hours as needed., Disp: , Rfl:     Cholecalciferol (VITAMIN D) 2000 UNIT Tab, Take  by mouth every day., Disp: , Rfl:     B Complex Vitamins (B COMPLEX 50 PO), Take  by mouth., Disp: , Rfl:     clopidogrel (PLAVIX) 75 MG Tab, Take 1 Tablet by mouth every day., Disp: 90 Tablet, Rfl: 1    valACYclovir (VALTREX) 500 MG Tab, Take 1 tablet by mouth once daily, Disp: 90 Tablet, Rfl: 1    aspirin 81 MG EC tablet, Take 1 Tablet by mouth every day., Disp: 30 Tablet, Rfl: 5    valacyclovir (VALTREX) 1 GM Tab, TAKE 1 TABLET BY MOUTH ONCE DAILY FOR 5 DAYS AS NEEDED FOR  HERPES  FLAREUP, Disp: 30 Tablet, Rfl: 0  ALLERGIES:   Allergies   Allergen Reactions    Amitriptyline Swelling    Trazodone Shortness of Breath    Amoxicillin Hives     SURGHX:   Past Surgical History:   Procedure Laterality Date    HYSTERECTOMY ROBOTIC Bilateral 05/06/2016    Procedure: HYSTERECTOMY ROBOTIC WITH MODESTA SALPINGECTOMY ;  Surgeon: Samantha Barnes M.D.;  Location: SURGERY SAME DAY [facility];  Service:     OTHER  03/01/2016    left shoulder lump removed    BREAST RECONSTRUCTION      CHOLECYSTECTOMY      OTHER  4-1-2016 teeth extraction     SOCHX:  reports that she has never smoked. She has never used smokeless tobacco. She reports that she does not currently use alcohol. She reports that she does not currently use drugs after having used the following drugs: Marijuana.  FH: Family history was reviewed, no pertinent findings to report   Objective:   /62 (BP Location: Left arm, Patient Position: Sitting, BP Cuff Size:  "Adult)   Pulse 86   Temp 37 °C (98.6 °F) (Temporal)   Resp 20   Ht 1.702 m (5' 7\")   Wt 77.7 kg (171 lb 6.4 oz)   LMP 04/27/2015   SpO2 95%   BMI 26.85 kg/m²   Physical Exam  Vitals reviewed.   Constitutional:       General: She is not in acute distress.     Appearance: Normal appearance. She is well-developed. She is not toxic-appearing.   HENT:      Head: Normocephalic and atraumatic.      Right Ear: External ear normal.      Left Ear: External ear normal.      Nose: Nose normal.   Cardiovascular:      Rate and Rhythm: Normal rate and regular rhythm.   Pulmonary:      Effort: Pulmonary effort is normal. No respiratory distress.      Breath sounds: Normal breath sounds. No stridor. No decreased breath sounds, wheezing, rhonchi or rales.   Abdominal:      Comments: Normal active bowel sounds.  Abdomen is soft and nontender to palpation.  No palpable masses organomegaly.  No guarding or rebound tenderness.  No CVA tenderness bilaterally.   Skin:     General: Skin is dry.   Neurological:      Comments: Alert and oriented.    Psychiatric:         Speech: Speech normal.         Behavior: Behavior normal.           Assessment/Plan:   1. Diarrhea, unspecified type  - loperamide (IMODIUM) 2 MG Cap; Take 1 Capsule by mouth 4 times a day as needed for Diarrhea.  Dispense: 30 Capsule; Refill: 0  - CULTURE STOOL; Future  - C Diff by PCR rflx Toxin; Future  - CRYPTO/GIARDIA RAPID ASSAY; Future    2. Hemorrhoids, unspecified hemorrhoid type  - hydrocortisone rectal (PERIANAL) 2.5% Cream; Insert 1 Units into the rectum 2 times a day.  Dispense: 28 g; Refill: 0    Given duration and persistence of symptoms, recommend stool studies to further evaluate.  I will contact patient with results via MyChart and adjust treatment plan as indicated.    Eat a BRAT diet - Bananas, Rice, Applesauce, Toast and other bland foods to prevent inflammation until symptoms resolve. Hydrate with 2-3 liters of water daily.  Recommend Pedialyte or " 50/50 water/ Gatorade mix. Avoid strenuous activities and dairy products while having diarrhea. Avoid alcohol, coffee, tea, cola drinks, chocolate, and other foods with caffeine- they increase stomach acid.      Immodium or pepto bismol as needed for diarrhea.  Recommend immediate reevaluation with any new or worsening symptoms.    If you develop severe abdominal pain, bloody stools, high fevers, intractable vomiting, inability to tolerate oral intake, confusion or other severe and concerning symptom please call 911 or go to the emergency department for reevaluation.

## 2024-06-27 ENCOUNTER — HOSPITAL ENCOUNTER (OUTPATIENT)
Facility: MEDICAL CENTER | Age: 39
End: 2024-06-27
Attending: PHYSICIAN ASSISTANT
Payer: MEDICAID

## 2024-06-27 DIAGNOSIS — R19.7 DIARRHEA, UNSPECIFIED TYPE: ICD-10-CM

## 2024-06-27 LAB
C DIFF DNA SPEC QL NAA+PROBE: NEGATIVE
C DIFF TOX GENS STL QL NAA+PROBE: NEGATIVE
C PARVUM AG STL QL IA.RAPID: NEGATIVE
G LAMBLIA AG STL QL IA.RAPID: NEGATIVE

## 2024-06-27 PROCEDURE — 87899 AGENT NOS ASSAY W/OPTIC: CPT

## 2024-06-27 PROCEDURE — 87045 FECES CULTURE AEROBIC BACT: CPT

## 2024-06-27 PROCEDURE — 87328 CRYPTOSPORIDIUM AG IA: CPT

## 2024-06-27 PROCEDURE — 87329 GIARDIA AG IA: CPT

## 2024-06-27 PROCEDURE — 87493 C DIFF AMPLIFIED PROBE: CPT

## 2024-06-27 PROCEDURE — 87046 STOOL CULTR AEROBIC BACT EA: CPT

## 2024-06-28 ENCOUNTER — HOSPITAL ENCOUNTER (OUTPATIENT)
Dept: LAB | Facility: MEDICAL CENTER | Age: 39
End: 2024-06-28
Payer: MEDICAID

## 2024-06-28 DIAGNOSIS — A09 DIARRHEA OF INFECTIOUS ORIGIN: ICD-10-CM

## 2024-06-28 LAB
ANION GAP SERPL CALC-SCNC: 8 MMOL/L (ref 7–16)
BUN SERPL-MCNC: 12 MG/DL (ref 8–22)
CALCIUM SERPL-MCNC: 9.2 MG/DL (ref 8.5–10.5)
CHLORIDE SERPL-SCNC: 106 MMOL/L (ref 96–112)
CO2 SERPL-SCNC: 26 MMOL/L (ref 20–33)
CREAT SERPL-MCNC: 0.55 MG/DL (ref 0.5–1.4)
E COLI SXT1+2 STL IA: NORMAL
GFR SERPLBLD CREATININE-BSD FMLA CKD-EPI: 118 ML/MIN/1.73 M 2
GLUCOSE SERPL-MCNC: 99 MG/DL (ref 65–99)
POTASSIUM SERPL-SCNC: 4.1 MMOL/L (ref 3.6–5.5)
SIGNIFICANT IND 70042: NORMAL
SITE SITE: NORMAL
SODIUM SERPL-SCNC: 140 MMOL/L (ref 135–145)
SOURCE SOURCE: NORMAL

## 2024-06-28 PROCEDURE — 36415 COLL VENOUS BLD VENIPUNCTURE: CPT

## 2024-06-28 PROCEDURE — 80048 BASIC METABOLIC PNL TOTAL CA: CPT

## 2024-06-28 NOTE — RESULT ENCOUNTER NOTE
JUANITO García,    So far your testing has been negative, but I am still waiting on the stool culture.    Kind regards,  Jeanmarie

## 2024-06-30 LAB
BACTERIA STL CULT: NORMAL
E COLI SXT1+2 STL IA: NORMAL
SIGNIFICANT IND 70042: NORMAL
SITE SITE: NORMAL
SOURCE SOURCE: NORMAL

## 2024-06-30 NOTE — RESULT ENCOUNTER NOTE
Hi Raquel,    All of your testing was negative.  I recommend we have you follow-up with primary care this week to discuss referral to a GI specialist.    Kind regards,  Jeanmarie

## 2024-07-09 ENCOUNTER — OFFICE VISIT (OUTPATIENT)
Dept: CARDIOLOGY | Facility: MEDICAL CENTER | Age: 39
End: 2024-07-09
Attending: NURSE PRACTITIONER
Payer: MEDICAID

## 2024-07-09 VITALS
DIASTOLIC BLOOD PRESSURE: 58 MMHG | HEIGHT: 67 IN | RESPIRATION RATE: 16 BRPM | SYSTOLIC BLOOD PRESSURE: 98 MMHG | BODY MASS INDEX: 28.09 KG/M2 | WEIGHT: 179 LBS | HEART RATE: 89 BPM | OXYGEN SATURATION: 98 %

## 2024-07-09 DIAGNOSIS — Z87.74 S/P PATENT FORAMEN OVALE CLOSURE: ICD-10-CM

## 2024-07-09 DIAGNOSIS — I63.49 CEREBROVASCULAR ACCIDENT (CVA) DUE TO EMBOLISM OF OTHER CEREBRAL ARTERY (HCC): ICD-10-CM

## 2024-07-09 PROCEDURE — 99214 OFFICE O/P EST MOD 30 MIN: CPT | Performed by: NURSE PRACTITIONER

## 2024-07-09 PROCEDURE — 99212 OFFICE O/P EST SF 10 MIN: CPT | Performed by: NURSE PRACTITIONER

## 2024-07-09 PROCEDURE — 3074F SYST BP LT 130 MM HG: CPT | Performed by: NURSE PRACTITIONER

## 2024-07-09 PROCEDURE — 3078F DIAST BP <80 MM HG: CPT | Performed by: NURSE PRACTITIONER

## 2024-07-09 ASSESSMENT — ENCOUNTER SYMPTOMS
SHORTNESS OF BREATH: 0
ORTHOPNEA: 0
PALPITATIONS: 0
COUGH: 0
PND: 0
MYALGIAS: 0
ABDOMINAL PAIN: 0
DIZZINESS: 0
NECK PAIN: 1
CLAUDICATION: 0
DEPRESSION: 1
FEVER: 0
BACK PAIN: 1

## 2024-07-09 ASSESSMENT — FIBROSIS 4 INDEX: FIB4 SCORE: 0.44

## 2024-07-26 ENCOUNTER — HOSPITAL ENCOUNTER (OUTPATIENT)
Dept: CARDIOLOGY | Facility: MEDICAL CENTER | Age: 39
End: 2024-07-26
Attending: NURSE PRACTITIONER
Payer: MEDICAID

## 2024-07-26 DIAGNOSIS — Z87.74 S/P PATENT FORAMEN OVALE CLOSURE: ICD-10-CM

## 2024-07-26 LAB
LV EJECT FRACT  99904: 60
LV EJECT FRACT MOD 2C 99903: 51.69
LV EJECT FRACT MOD 4C 99902: 59.76
LV EJECT FRACT MOD BP 99901: 57.04

## 2024-07-26 PROCEDURE — 93306 TTE W/DOPPLER COMPLETE: CPT

## 2024-07-26 PROCEDURE — 93306 TTE W/DOPPLER COMPLETE: CPT | Mod: 26 | Performed by: INTERNAL MEDICINE

## 2024-08-02 ENCOUNTER — TELEPHONE (OUTPATIENT)
Dept: CARDIOLOGY | Facility: MEDICAL CENTER | Age: 39
End: 2024-08-02
Payer: MEDICAID

## 2024-08-02 NOTE — TELEPHONE ENCOUNTER
"Message sent to [facility] regarding work release note.   [employer] message sent to patient  ----- Message from [facility] sent at 8/2/2024 12:24 PM PDT -----  Regarding: FW: Work release   Contact: [phone]    ----- Message -----  From: Paula Esquivel  Sent: 8/1/2024  10:15 AM PDT  To: Paulie Rodarte/Serafin  Subject: FW: Work release                                   ----- Message -----  From: Gwendolyn Hernandez \"Raquel\"  Sent: 8/1/2024  10:05 AM PDT  To: [facility] Customer Service  Subject: Work release                                     Topic: [facility] Question/Issue.    My work wants a work release note. I did not think I needed one but they want one of file to cover their butt in case I have a medical emergency after having my heart prodecure done. Is it possible to get a note?       "

## 2024-08-19 ENCOUNTER — APPOINTMENT (OUTPATIENT)
Dept: RADIOLOGY | Facility: MEDICAL CENTER | Age: 39
End: 2024-08-19
Attending: PSYCHIATRY & NEUROLOGY
Payer: MEDICAID

## 2024-08-26 DIAGNOSIS — A60.00 RECURRENT GENITAL HERPES: ICD-10-CM

## 2024-08-26 NOTE — TELEPHONE ENCOUNTER
Received request via: Pharmacy    Was the patient seen in the last year in this department? Yes    Does the patient have an active prescription (recently filled or refills available) for medication(s) requested? No    Pharmacy Name: [facility] [address] [place] [address] [[id number]]     Does the patient have detention Plus and need 100-day supply? (This applies to ALL medications) Patient does not have SCP

## 2024-08-27 RX ORDER — VALACYCLOVIR HYDROCHLORIDE 500 MG/1
500 TABLET, FILM COATED ORAL DAILY
Qty: 90 TABLET | Refills: 2 | Status: SHIPPED | OUTPATIENT
Start: 2024-08-27

## 2024-08-31 ENCOUNTER — APPOINTMENT (OUTPATIENT)
Dept: URGENT CARE | Facility: PHYSICIAN GROUP | Age: 39
End: 2024-08-31
Payer: MEDICAID

## 2024-08-31 ENCOUNTER — OFFICE VISIT (OUTPATIENT)
Dept: URGENT CARE | Facility: PHYSICIAN GROUP | Age: 39
End: 2024-08-31
Payer: MEDICAID

## 2024-08-31 VITALS
SYSTOLIC BLOOD PRESSURE: 116 MMHG | RESPIRATION RATE: 20 BRPM | OXYGEN SATURATION: 97 % | TEMPERATURE: 98.1 F | BODY MASS INDEX: 28.12 KG/M2 | WEIGHT: 175 LBS | DIASTOLIC BLOOD PRESSURE: 70 MMHG | HEART RATE: 93 BPM | HEIGHT: 66 IN

## 2024-08-31 DIAGNOSIS — L02.31 ABSCESS OF RIGHT BUTTOCK: ICD-10-CM

## 2024-08-31 RX ORDER — MUPIROCIN 20 MG/G
OINTMENT TOPICAL
Qty: 22 G | Refills: 3 | Status: SHIPPED | OUTPATIENT
Start: 2024-08-31

## 2024-08-31 ASSESSMENT — FIBROSIS 4 INDEX: FIB4 SCORE: 0.44

## 2024-08-31 NOTE — PROGRESS NOTES
"Subjective:   Gwendolyn Hernandez is a 39 y.o. female who presents for Bump (Painful bump under right buttock, pt sts it feels the size of her thumb, pt has photo looks red and purple, pt noticed yesterday )    Patient is a 39-year-old female presenting clinic today reporting 2 to 3-day history of raised, red/purple bump on inside of right buttock.  Patient states that it is getting more tender and larger.  She has been hot compress seeing.  She has not any fevers, chills, or nausea.    Medications, Allergies, and current problem list reviewed today in Epic.     Objective:     /70   Pulse 93   Temp 36.7 °C (98.1 °F) (Temporal)   Resp 20   Ht 1.676 m (5' 6\")   Wt 79.4 kg (175 lb)   SpO2 97%     Physical Exam  Vitals reviewed.   Constitutional:       Appearance: Normal appearance.   HENT:      Head: Normocephalic.      Nose: Nose normal.      Mouth/Throat:      Mouth: Mucous membranes are moist.   Eyes:      Extraocular Movements: Extraocular movements intact.      Conjunctiva/sclera: Conjunctivae normal.      Pupils: Pupils are equal, round, and reactive to light.   Cardiovascular:      Rate and Rhythm: Normal rate.   Pulmonary:      Effort: Pulmonary effort is normal.   Musculoskeletal:         General: Normal range of motion.      Cervical back: Normal range of motion and neck supple.   Skin:     General: Skin is warm and dry.      Comments: Right inner buttock: 1.5 cm in circumference raised, red/purple bump with pustule.  Negative for induration.  Positive for TTP.   Neurological:      Mental Status: She is alert and oriented to person, place, and time.   Psychiatric:         Mood and Affect: Mood normal.         Behavior: Behavior normal.         Thought Content: Thought content normal.         Judgment: Judgment normal.         Assessment/Plan:     Diagnosis and associated orders:     1. Abscess of right buttock  mupirocin (BACTROBAN) 2 % Ointment         Comments/MDM:     Procedure: Incision and " Drainage   -Risks, benefits, and alternatives discussed. Risks include infection, bleeding, nerve damage, and poor cosmetic outcome  -Clean technique with sterile instruments  -Local anesthesia with 1% lidocaine with epinephrine  -Incision with #11 blade into fluctuant area with small amount of purulent material expressed  -Cavity probed and any loculations bluntly taken down with hemostat  -Irrigated copiously with sterile saline  -Minimal bleeding with good hemostasis achieved  -The patient tolerated the procedure well  Polysporin and Band-Aid was applied.  Bactroban prescription sent to pharmacy. Educated patient about appropriate dressing changes.  Patient was involved with shared decision-making throughout the exam today and verbalizes understanding regards to plan of care, discharge instructions, and follow-up         Differential diagnosis, natural history, supportive care, and indications for immediate follow-up discussed.    Advised the patient to follow-up with the primary care physician for recheck, reevaluation, and consideration of further management.    I personally reviewed prior external notes and test results pertinent to today's visit as well as additional imaging and testing completed in clinic today.     Please note that this dictation was created using voice recognition software. I have made a reasonable attempt to correct obvious errors, but I expect that there are errors of grammar and possibly content that I did not discover before finalizing the note.

## 2024-09-10 ENCOUNTER — APPOINTMENT (OUTPATIENT)
Dept: NEUROLOGY | Facility: MEDICAL CENTER | Age: 39
End: 2024-09-10
Attending: PSYCHIATRY & NEUROLOGY
Payer: MEDICAID

## 2024-09-11 ENCOUNTER — APPOINTMENT (OUTPATIENT)
Dept: NEUROLOGY | Facility: MEDICAL CENTER | Age: 39
End: 2024-09-11
Attending: PSYCHIATRY & NEUROLOGY
Payer: MEDICAID

## 2024-10-05 ENCOUNTER — HOSPITAL ENCOUNTER (OUTPATIENT)
Dept: RADIOLOGY | Facility: MEDICAL CENTER | Age: 39
End: 2024-10-05
Attending: PSYCHIATRY & NEUROLOGY
Payer: MEDICAID

## 2024-10-05 DIAGNOSIS — I63.9 CEREBROVASCULAR ACCIDENT (CVA), UNSPECIFIED MECHANISM (HCC): ICD-10-CM

## 2024-10-05 PROCEDURE — 72157 MRI CHEST SPINE W/O & W/DYE: CPT

## 2024-10-05 PROCEDURE — 700117 HCHG RX CONTRAST REV CODE 255: Mod: JZ,UD | Performed by: PSYCHIATRY & NEUROLOGY

## 2024-10-05 PROCEDURE — A9579 GAD-BASE MR CONTRAST NOS,1ML: HCPCS | Mod: JZ,UD | Performed by: PSYCHIATRY & NEUROLOGY

## 2024-10-05 RX ADMIN — GADOTERIDOL 18 ML: 279.3 INJECTION, SOLUTION INTRAVENOUS at 07:40

## 2024-10-14 ENCOUNTER — OFFICE VISIT (OUTPATIENT)
Dept: INTERNAL MEDICINE | Facility: OTHER | Age: 39
End: 2024-10-14
Payer: MEDICAID

## 2024-10-14 VITALS
HEART RATE: 100 BPM | RESPIRATION RATE: 15 BRPM | SYSTOLIC BLOOD PRESSURE: 98 MMHG | TEMPERATURE: 98 F | BODY MASS INDEX: 27.79 KG/M2 | DIASTOLIC BLOOD PRESSURE: 80 MMHG | WEIGHT: 172.2 LBS | OXYGEN SATURATION: 98 %

## 2024-10-14 DIAGNOSIS — K62.5 BRIGHT RED BLOOD PER RECTUM: ICD-10-CM

## 2024-10-14 DIAGNOSIS — R53.83 FATIGUE, UNSPECIFIED TYPE: ICD-10-CM

## 2024-10-14 DIAGNOSIS — R63.0 DECREASED APPETITE: ICD-10-CM

## 2024-10-14 DIAGNOSIS — R11.0 NAUSEA: ICD-10-CM

## 2024-10-14 DIAGNOSIS — R10.9 ABDOMINAL DISCOMFORT: ICD-10-CM

## 2024-10-14 PROBLEM — Z90.710 S/P LAPAROSCOPIC HYSTERECTOMY: Status: ACTIVE | Noted: 2024-10-14

## 2024-10-14 PROBLEM — Z90.49 HISTORY OF LAPAROSCOPIC CHOLECYSTECTOMY: Status: ACTIVE | Noted: 2024-10-14

## 2024-10-14 PROCEDURE — 99214 OFFICE O/P EST MOD 30 MIN: CPT | Mod: GC

## 2024-10-14 RX ORDER — FAMOTIDINE 40 MG/1
40 TABLET, FILM COATED ORAL DAILY
Qty: 30 TABLET | Refills: 1 | Status: SHIPPED | OUTPATIENT
Start: 2024-10-14

## 2024-10-14 RX ORDER — B-COMPLEX WITH VITAMIN C
1 TABLET ORAL DAILY
COMMUNITY

## 2024-10-14 RX ORDER — DIAZEPAM 10 MG/1
TABLET ORAL
COMMUNITY
Start: 2024-07-26 | End: 2024-10-14

## 2024-10-14 RX ORDER — HYDROCORTISONE 25 MG/G
CREAM TOPICAL 2 TIMES DAILY
COMMUNITY
End: 2024-10-14

## 2024-10-14 RX ORDER — GABAPENTIN 300 MG/1
300 CAPSULE ORAL 2 TIMES DAILY
COMMUNITY
Start: 2024-07-26 | End: 2024-10-14

## 2024-10-14 RX ORDER — MELOXICAM 15 MG/1
15 TABLET ORAL DAILY
COMMUNITY
Start: 2024-07-29 | End: 2024-10-14

## 2024-10-14 RX ORDER — TRAMADOL HYDROCHLORIDE 50 MG/1
50 TABLET ORAL 2 TIMES DAILY
COMMUNITY
End: 2024-10-14

## 2024-10-14 RX ORDER — LOPERAMIDE HYDROCHLORIDE 2 MG/1
2 CAPSULE ORAL 4 TIMES DAILY PRN
COMMUNITY
End: 2024-10-14

## 2024-10-14 RX ORDER — METHOCARBAMOL 500 MG/1
500 TABLET, FILM COATED ORAL 3 TIMES DAILY
COMMUNITY
Start: 2024-07-29 | End: 2024-10-14

## 2024-10-14 ASSESSMENT — FIBROSIS 4 INDEX: FIB4 SCORE: 0.44

## 2024-10-22 ENCOUNTER — HOSPITAL ENCOUNTER (OUTPATIENT)
Dept: RADIOLOGY | Facility: MEDICAL CENTER | Age: 39
End: 2024-10-22
Payer: MEDICAID

## 2024-10-22 DIAGNOSIS — R10.9 ABDOMINAL DISCOMFORT: ICD-10-CM

## 2024-10-22 DIAGNOSIS — R11.0 NAUSEA: ICD-10-CM

## 2024-10-22 DIAGNOSIS — K62.5 BRIGHT RED BLOOD PER RECTUM: ICD-10-CM

## 2024-10-22 DIAGNOSIS — R63.0 DECREASED APPETITE: ICD-10-CM

## 2024-10-22 PROCEDURE — 74176 CT ABD & PELVIS W/O CONTRAST: CPT

## 2024-11-20 ENCOUNTER — APPOINTMENT (OUTPATIENT)
Dept: INTERNAL MEDICINE | Facility: OTHER | Age: 39
End: 2024-11-20
Payer: MEDICAID

## 2024-12-02 ENCOUNTER — OFFICE VISIT (OUTPATIENT)
Dept: URGENT CARE | Facility: PHYSICIAN GROUP | Age: 39
End: 2024-12-02
Payer: MEDICAID

## 2024-12-02 VITALS
DIASTOLIC BLOOD PRESSURE: 66 MMHG | OXYGEN SATURATION: 97 % | HEART RATE: 70 BPM | BODY MASS INDEX: 27.47 KG/M2 | HEIGHT: 67 IN | TEMPERATURE: 97.7 F | WEIGHT: 175 LBS | RESPIRATION RATE: 14 BRPM | SYSTOLIC BLOOD PRESSURE: 96 MMHG

## 2024-12-02 DIAGNOSIS — H10.32 ACUTE BACTERIAL CONJUNCTIVITIS OF LEFT EYE: ICD-10-CM

## 2024-12-02 DIAGNOSIS — H57.89 EYE IRRITATION: ICD-10-CM

## 2024-12-02 DIAGNOSIS — H57.89 EYE SWELLING, LEFT: ICD-10-CM

## 2024-12-02 PROCEDURE — 3078F DIAST BP <80 MM HG: CPT

## 2024-12-02 PROCEDURE — 99213 OFFICE O/P EST LOW 20 MIN: CPT

## 2024-12-02 PROCEDURE — 3074F SYST BP LT 130 MM HG: CPT

## 2024-12-02 RX ORDER — DEXAMETHASONE SODIUM PHOSPHATE 10 MG/ML
10 INJECTION INTRAMUSCULAR; INTRAVENOUS ONCE
Status: SHIPPED | OUTPATIENT
Start: 2024-12-02 | End: 2024-12-03

## 2024-12-02 RX ORDER — OFLOXACIN 3 MG/ML
1 SOLUTION/ DROPS OPHTHALMIC 4 TIMES DAILY
Qty: 5 ML | Refills: 0 | Status: SHIPPED | OUTPATIENT
Start: 2024-12-02 | End: 2024-12-09

## 2024-12-02 ASSESSMENT — FIBROSIS 4 INDEX: FIB4 SCORE: 0.44

## 2024-12-03 NOTE — PROGRESS NOTES
Subjective     Raquel Hernandez is a 39 y.o. female who presents with Eye Problem (L eye burning, started today)    HPI:   Raquel is a 40yo female presenting today for evaluation of left eye redness and irritation x1 day. Reports associated mild swelling to eye lids and intermittent headache. Reports burning sensation in eye with no vision loss or visual changes. Denies trauma to eye. No current foreign body sensation. Denies photophobia. She does wear contacts. Denies flashes/floaters. No vomiting. Denies pain with extraocular movement. No tenderness to palpitation of periorbital cavity. She has attempted OTC eye drops for relief. No rash.           Review of Systems   Constitutional:  Negative for fever.   HENT:  Negative for congestion, ear discharge, ear pain and sinus pain.    Eyes:  Positive for discharge and redness. Negative for double vision, photophobia and pain.   Respiratory:  Negative for cough, sputum production, shortness of breath, wheezing and stridor.    Gastrointestinal:  Negative for abdominal pain, diarrhea, nausea and vomiting.   Musculoskeletal:  Negative for myalgias and neck pain.   Skin:  Negative for rash.   Neurological:  Negative for dizziness, tingling, sensory change and weakness.     Past Medical History:   Diagnosis Date    Anemia     Gynecological disorder     Heart murmur     High cholesterol     PONV (postoperative nausea and vomiting)     Stroke (HCC)      Past Surgical History:   Procedure Laterality Date    HYSTERECTOMY ROBOTIC Bilateral 05/06/2016    Procedure: HYSTERECTOMY ROBOTIC WITH MODESTA SALPINGECTOMY ;  Surgeon: Samantha Barnes M.D.;  Location: SURGERY SAME DAY [facility];  Service:     OTHER  03/01/2016    left shoulder lump removed    BREAST RECONSTRUCTION      CHOLECYSTECTOMY      OTHER  4-1-2016 teeth extraction     Allergies: Amitriptyline, Trazodone, and Amoxicillin     Current Outpatient Medications:     B Complex-C (VITAMIN B + C COMPLEX) Tab, Take 1 Tablet by mouth every  "day., Disp: , Rfl:     vitamin D3 (CHOLECALCIFEROL) 400 UNIT Tab, Take 1,000 Units by mouth every day., Disp: , Rfl:     famotidine (PEPCID) 40 MG Tab, Take 1 Tablet by mouth every day., Disp: 30 Tablet, Rfl: 1    valACYclovir (VALTREX) 500 MG Tab, Take 1 Tablet by mouth every day., Disp: 90 Tablet, Rfl: 2    aspirin 81 MG EC tablet, Take 1 Tablet by mouth every day., Disp: 30 Tablet, Rfl: 5    valacyclovir (VALTREX) 1 GM Tab, TAKE 1 TABLET BY MOUTH ONCE DAILY FOR 5 DAYS AS NEEDED FOR  HERPES  FLAREUP, Disp: 30 Tablet, Rfl: 0    Social History     Tobacco Use    Smoking status: Never    Smokeless tobacco: Never   Vaping Use    Vaping status: Former    Substances: CBD, Started vaping for a week to try to calm anxiety   Substance Use Topics    Alcohol use: Not Currently     Comment: maybe 3 times a year    Drug use: Not Currently     Types: Marijuana     Comment: tried edibles for pain in Sept     Family History   Problem Relation Age of Onset    Genitourinary () Problems Mother     Cancer Father      Medications, Allergies, and current problem list reviewed today in Epic.      Objective     BP 96/66   Pulse 70   Temp 36.5 °C (97.7 °F) (Temporal)   Resp 14   Ht 1.702 m (5' 7\")   Wt 79.4 kg (175 lb)   LMP 04/27/2015   SpO2 97%   BMI 27.41 kg/m²      Physical Exam  Vitals reviewed.   Constitutional:       General: She is not in acute distress.  HENT:      Head: No right periorbital erythema or left periorbital erythema.      Jaw: There is normal jaw occlusion. No tenderness, swelling, pain on movement or malocclusion.      Right Ear: Tympanic membrane, ear canal and external ear normal.      Left Ear: Tympanic membrane, ear canal and external ear normal.      Nose: Nose normal.      Right Sinus: No maxillary sinus tenderness or frontal sinus tenderness.      Left Sinus: No maxillary sinus tenderness or frontal sinus tenderness.      Mouth/Throat:      Mouth: Mucous membranes are moist.      Pharynx: Uvula " midline. No oropharyngeal exudate, posterior oropharyngeal erythema or uvula swelling.   Eyes:      General: Vision grossly intact. Gaze aligned appropriately. No visual field deficit.        Right eye: No foreign body, discharge or hordeolum.         Left eye: Discharge present.No foreign body or hordeolum.      Extraocular Movements: Extraocular movements intact.      Right eye: No nystagmus.      Left eye: No nystagmus.      Conjunctiva/sclera:      Right eye: Right conjunctiva is not injected. No exudate.     Left eye: Left conjunctiva is injected. Exudate present.      Pupils: Pupils are equal, round, and reactive to light.      Right eye: Pupil is round, reactive and not sluggish.      Left eye: Pupil is round, reactive and not sluggish.      Funduscopic exam:     Right eye: Red reflex present.         Left eye: Red reflex present.     Comments: No pain with extraocular movement. Pupils 2mm and PERRLA bilaterally. Absence of hazy/cloudy cornea bilaterally. No proptosis. No Patel's sign.      Cardiovascular:      Rate and Rhythm: Normal rate and regular rhythm.      Pulses: Normal pulses.      Heart sounds: Normal heart sounds.   Pulmonary:      Effort: Pulmonary effort is normal. No tachypnea, accessory muscle usage, prolonged expiration, respiratory distress or retractions.      Breath sounds: Normal breath sounds. No stridor, decreased air movement or transmitted upper airway sounds. No wheezing, rhonchi or rales.   Musculoskeletal:         General: Normal range of motion.      Cervical back: Full passive range of motion without pain, normal range of motion and neck supple. No rigidity. Normal range of motion.   Skin:     General: Skin is warm and dry.      Findings: No rash.   Neurological:      Mental Status: She is alert. Mental status is at baseline.      Sensory: Sensation is intact.      Motor: Motor function is intact.      Coordination: Coordination is intact.      Gait: Gait is intact.    Psychiatric:         Mood and Affect: Mood normal.         Behavior: Behavior normal.         Thought Content: Thought content normal.       Assessment & Plan     1. Acute bacterial conjunctivitis of left eye   - ofloxacin (OCUFLOX) 0.3 % Solution; Administer 1 Drop into the left eye 4 times a day for 7 days.  Dispense: 5 mL; Refill: 0    2. Eye irritation     3. Eye swelling, left   - dexamethasone (Decadron) injection (check route below) 10 mg       MDM/Comments:   History and examination is without any alarm features/red flag findings.  Discussion with patient regarding limited ability of urgent care setting to perform a more in depth eye examination. Patient verbalizes understanding of when to present to emergency room or Opthalmologist. Red flag findings discussed.      - Cool compress to eyes   - OTC cetirizine PRN     Warm compresses to affected eye(s) 3 times daily  Hand hygiene discussed  Wash all recently used linens and towels in hot water  Do not touch dropper to eye (s)  Supportive care, differential diagnoses, and indications for immediate follow-up discussed with patient.    Pathogenesis of diagnosis discussed including typical length and natural progression.    Instructed to return to  or nearest emergency department if symptoms fail to improve, for any change in condition, further concerns, or new concerning symptoms.  Patient states understanding of the plan of care and discharge instructions       Illness progression and alarm symptoms discussed with patient, emphasizing low threshold for returning to clinic/emergency department for worsening symptoms. Patient is agreeable to the plan and verbalizes understanding, and will follow up if warranted.        Differential diagnosis, natural history, supportive care, and indications for immediate follow-up discussed.       Follow-up as needed if symptoms worsen or fail to improve to PCP, Urgent care or Emergency Room.                                                                  Electronically signed by Salo Sanchez, APRN

## 2024-12-04 ASSESSMENT — ENCOUNTER SYMPTOMS
COUGH: 0
DIARRHEA: 0
SPUTUM PRODUCTION: 0
DOUBLE VISION: 0
NAUSEA: 0
WEAKNESS: 0
MYALGIAS: 0
FEVER: 0
EYE PAIN: 0
NECK PAIN: 0
SINUS PAIN: 0
EYE REDNESS: 1
WHEEZING: 0
STRIDOR: 0
TINGLING: 0
SENSORY CHANGE: 0
DIZZINESS: 0
SHORTNESS OF BREATH: 0
PHOTOPHOBIA: 0
ABDOMINAL PAIN: 0
VOMITING: 0
EYE DISCHARGE: 1

## 2024-12-04 ASSESSMENT — VISUAL ACUITY: OU: 1

## 2025-01-17 ENCOUNTER — OFFICE VISIT (OUTPATIENT)
Dept: INTERNAL MEDICINE | Facility: OTHER | Age: 40
End: 2025-01-17
Payer: MEDICAID

## 2025-01-17 VITALS
HEART RATE: 79 BPM | TEMPERATURE: 97.1 F | WEIGHT: 175.8 LBS | BODY MASS INDEX: 28.25 KG/M2 | HEIGHT: 66 IN | DIASTOLIC BLOOD PRESSURE: 62 MMHG | SYSTOLIC BLOOD PRESSURE: 94 MMHG | OXYGEN SATURATION: 100 %

## 2025-01-17 DIAGNOSIS — F41.9 ANXIETY: ICD-10-CM

## 2025-01-17 DIAGNOSIS — R21 RASH OF BODY: ICD-10-CM

## 2025-01-17 DIAGNOSIS — Z91.89 AT RISK FOR DEPRESSED MOOD: ICD-10-CM

## 2025-01-17 PROCEDURE — 3074F SYST BP LT 130 MM HG: CPT | Mod: GC

## 2025-01-17 PROCEDURE — 3078F DIAST BP <80 MM HG: CPT | Mod: GC

## 2025-01-17 PROCEDURE — 99214 OFFICE O/P EST MOD 30 MIN: CPT | Mod: GC

## 2025-01-17 RX ORDER — HYDROXYZINE HYDROCHLORIDE 25 MG/1
25 TABLET, FILM COATED ORAL EVERY 8 HOURS PRN
Qty: 30 TABLET | Refills: 0 | Status: SHIPPED | OUTPATIENT
Start: 2025-01-17

## 2025-01-17 RX ORDER — METHYLPREDNISOLONE 4 MG/1
TABLET ORAL
Qty: 21 TABLET | Refills: 0 | Status: SHIPPED | OUTPATIENT
Start: 2025-01-17

## 2025-01-17 ASSESSMENT — ENCOUNTER SYMPTOMS
FALLS: 0
CHILLS: 0
FEVER: 0
VOMITING: 0
EYE REDNESS: 0
SHORTNESS OF BREATH: 0
WEAKNESS: 0
ABDOMINAL PAIN: 0
WHEEZING: 0

## 2025-01-17 ASSESSMENT — FIBROSIS 4 INDEX: FIB4 SCORE: 0.44

## 2025-01-17 ASSESSMENT — PATIENT HEALTH QUESTIONNAIRE - PHQ9
CLINICAL INTERPRETATION OF PHQ2 SCORE: 4
5. POOR APPETITE OR OVEREATING: 2 - MORE THAN HALF THE DAYS
SUM OF ALL RESPONSES TO PHQ QUESTIONS 1-9: 14

## 2025-01-17 NOTE — ASSESSMENT & PLAN NOTE
"2 week history. Started with chest, then spread to back and upper arms. Associated with itching  Denies new soaps, perfumes, foods, medications, supplements.  Did use new color in hair recently, although used the same brand.  Has allergies to some medications but denies usually having seasonal allergies.   Oatmeal baths help with itching for a few hours, benedryl and OTC bland eczema cream didn't help.  Reports having an episode 11 years ago where she got hives that were triggered by crying. Doctor at that time called it \"child anxiety\". Does report that some extra people moved into her house recently (now 7 people) and that it has increased her stress over the last few months but otherwise hasn't been that bothersome.  - continue oatmeal baths, try to take colder showers  - medrol dose pack  - hydroxyzine for breakthrough itching  - take photos if rash recurs and bring to clinic  - rtc if itching doesnt improve  "

## 2025-01-17 NOTE — ASSESSMENT & PLAN NOTE
PHQ-9 = 14  Patient reports fluctuating depressed feelings depending on her weight and current circumstances  Was treated for anxiety a couple years ago, which was more related to sleep issues for which she occasionally used lorazepam to help sleep. Previously trailed Wellbutrin and doxepin as well which didn't help sleep much at that time. Sleep symptoms have improved since then.  -  referral placed

## 2025-01-17 NOTE — PROGRESS NOTES
ESTABLISHED PATIENT VISIT    PATIENT ID:  Name: Gwendolyn Hernandez  YOB: 1985  Patient Care Team:  Paula Phillips M.D. as PCP - General (Internal Medicine)  Jeremiah Verma, PT, DPT as Physical Therapist (Physical Therapy)    CHIEF COMPLAINT(s):  Rash (Upper body x 2 weeks ) and Other (PHQ-9 due = 14 )  Follow up for Diagnoses of Rash of upper body, At risk for depressed mood, and Anxiety were pertinent to this visit.    History of Present Illness:    This is a pleasant 39 y.o. female clinic patient established with my colleague Dr. Phillips who presents today for evaluation of rash       Review of Systems   Constitutional:  Negative for chills and fever.   HENT:  Negative for congestion.    Eyes:  Negative for redness.   Respiratory:  Negative for shortness of breath and wheezing.    Cardiovascular:  Negative for chest pain.   Gastrointestinal:  Negative for abdominal pain and vomiting.   Musculoskeletal:  Negative for falls.   Skin:  Positive for itching and rash.   Neurological:  Negative for weakness.       Past Medical History:   Diagnosis Date    Anemia     Gynecological disorder     Heart murmur     High cholesterol     PONV (postoperative nausea and vomiting)     Stroke (HCC)      Past Surgical History:   Procedure Laterality Date    HYSTERECTOMY ROBOTIC Bilateral 05/06/2016    Procedure: HYSTERECTOMY ROBOTIC WITH MODESTA SALPINGECTOMY ;  Surgeon: Samantha Barnes M.D.;  Location: SURGERY SAME DAY [facility];  Service:     OTHER  03/01/2016    left shoulder lump removed    BREAST RECONSTRUCTION      CHOLECYSTECTOMY      OTHER  4-1-2016 teeth extraction     Family History   Problem Relation Age of Onset    Genitourinary () Problems Mother     Cancer Father      Amitriptyline, Trazodone, and Amoxicillin  Social History     Tobacco Use    Smoking status: Never    Smokeless tobacco: Never   Vaping Use    Vaping status: Former    Substances: CBD, Started vaping for a week to try to calm anxiety  "  Substance Use Topics    Alcohol use: Not Currently     Comment: maybe 3 times a year    Drug use: Not Currently     Types: Marijuana     Comment: tried edibles for pain in Sept     Current Outpatient Medications   Medication Sig Dispense Refill    methylPREDNISolone (MEDROL DOSEPAK) 4 MG Tablet Therapy Pack As directed on the packaging label. 21 Tablet 0    hydrOXYzine HCl (ATARAX) 25 MG Tab Take 1 Tablet by mouth every 8 hours as needed for Itching. 30 Tablet 0    B Complex-C (VITAMIN B + C COMPLEX) Tab Take 1 Tablet by mouth every day.      vitamin D3 (CHOLECALCIFEROL) 400 UNIT Tab Take 1,000 Units by mouth every day.      famotidine (PEPCID) 40 MG Tab Take 1 Tablet by mouth every day. 30 Tablet 1    valACYclovir (VALTREX) 500 MG Tab Take 1 Tablet by mouth every day. 90 Tablet 2    aspirin 81 MG EC tablet Take 1 Tablet by mouth every day. 30 Tablet 5    valacyclovir (VALTREX) 1 GM Tab TAKE 1 TABLET BY MOUTH ONCE DAILY FOR 5 DAYS AS NEEDED FOR  HERPES  FLAREUP 30 Tablet 0     No current facility-administered medications for this visit.       OBJECTIVE:  BP 94/62 (BP Location: Left arm, Patient Position: Sitting, BP Cuff Size: Adult)   Pulse 79   Temp 36.2 °C (97.1 °F)   Ht 1.664 m (5' 5.5\")   Wt 79.7 kg (175 lb 12.8 oz)   LMP 04/27/2015   SpO2 100%   BMI 28.81 kg/m²   Physical Exam  Vitals reviewed.   Constitutional:       General: She is not in acute distress.     Appearance: Normal appearance. She is not ill-appearing or toxic-appearing.   HENT:      Head: Normocephalic.      Nose: Nose normal.   Eyes:      Conjunctiva/sclera: Conjunctivae normal.   Cardiovascular:      Rate and Rhythm: Normal rate.   Pulmonary:      Effort: Pulmonary effort is normal. No respiratory distress.      Breath sounds: No wheezing.   Musculoskeletal:         General: Normal range of motion.      Cervical back: Normal range of motion.      Right lower leg: No edema.      Left lower leg: No edema.   Skin:     General: Skin is " "warm and dry.      Coloration: Skin is not jaundiced or pale.      Findings: Erythema (non-swelling erythema scattered across chest, back, and upper arms) and rash (Rare raised bumps on chest, surrounded by erythema) present.   Neurological:      General: No focal deficit present.      Mental Status: She is alert and oriented to person, place, and time.   Psychiatric:         Mood and Affect: Mood normal.         Behavior: Behavior normal.         ASSESSMENT AND PLAN:     Problem List Items Addressed This Visit       At risk for depressed mood     PHQ-9 = 14  Patient reports fluctuating depressed feelings depending on her weight and current circumstances  Was treated for anxiety a couple years ago, which was more related to sleep issues for which she occasionally used lorazepam to help sleep. Previously trailed Wellbutrin and doxepin as well which didn't help sleep much at that time. Sleep symptoms have improved since then.  -  referral placed         Relevant Orders    Patient has been identified as having a positive depression screening. Appropriate orders and counseling have been given. (Completed)    Referral to Behavioral Health    Rash of upper body     2 week history. Started with chest, then spread to back and upper arms. Associated with itching  Denies new soaps, perfumes, foods, medications, supplements.  Did use new color in hair recently, although used the same brand.  Has allergies to some medications but denies usually having seasonal allergies.   Oatmeal baths help with itching for a few hours, benedryl and OTC bland eczema cream didn't help.  Reports having an episode 11 years ago where she got hives that were triggered by crying. Doctor at that time called it \"child anxiety\". Does report that some extra people moved into her house recently (now 7 people) and that it has increased her stress over the last few months but otherwise hasn't been that bothersome.  - continue oatmeal baths, try to take " colder showers  - medrol dose pack  - hydroxyzine for breakthrough itching  - take photos if rash recurs and bring to clinic  - rtc if itching doesnt improve         Relevant Medications    methylPREDNISolone (MEDROL DOSEPAK) 4 MG Tablet Therapy Pack    hydrOXYzine HCl (ATARAX) 25 MG Tab    Anxiety    Relevant Medications    hydrOXYzine HCl (ATARAX) 25 MG Tab    Other Relevant Orders    Patient has been identified as having a positive depression screening. Appropriate orders and counseling have been given. (Completed)    Referral to Behavioral Health       Orders Placed This Encounter    Referral to Behavioral Health    Patient has been identified as having a positive depression screening. Appropriate orders and counseling have been given.    methylPREDNISolone (MEDROL DOSEPAK) 4 MG Tablet Therapy Pack    hydrOXYzine HCl (ATARAX) 25 MG Tab       Return in about 4 weeks (around 2/14/2025).    Carlie Dickey M.D.  [facility] Internal Medicine Resident

## 2025-01-29 SDOH — HEALTH STABILITY: PHYSICAL HEALTH: ON AVERAGE, HOW MANY MINUTES DO YOU ENGAGE IN EXERCISE AT THIS LEVEL?: 0 MIN

## 2025-01-29 SDOH — ECONOMIC STABILITY: FOOD INSECURITY: WITHIN THE PAST 12 MONTHS, YOU WORRIED THAT YOUR FOOD WOULD RUN OUT BEFORE YOU GOT MONEY TO BUY MORE.: NEVER TRUE

## 2025-01-29 SDOH — ECONOMIC STABILITY: INCOME INSECURITY: HOW HARD IS IT FOR YOU TO PAY FOR THE VERY BASICS LIKE FOOD, HOUSING, MEDICAL CARE, AND HEATING?: SOMEWHAT HARD

## 2025-01-29 SDOH — ECONOMIC STABILITY: INCOME INSECURITY: IN THE LAST 12 MONTHS, WAS THERE A TIME WHEN YOU WERE NOT ABLE TO PAY THE MORTGAGE OR RENT ON TIME?: NO

## 2025-01-29 SDOH — HEALTH STABILITY: MENTAL HEALTH
STRESS IS WHEN SOMEONE FEELS TENSE, NERVOUS, ANXIOUS, OR CAN'T SLEEP AT NIGHT BECAUSE THEIR MIND IS TROUBLED. HOW STRESSED ARE YOU?: VERY MUCH

## 2025-01-29 SDOH — HEALTH STABILITY: PHYSICAL HEALTH: ON AVERAGE, HOW MANY DAYS PER WEEK DO YOU ENGAGE IN MODERATE TO STRENUOUS EXERCISE (LIKE A BRISK WALK)?: 0 DAYS

## 2025-01-29 SDOH — ECONOMIC STABILITY: HOUSING INSECURITY
IN THE LAST 12 MONTHS, WAS THERE A TIME WHEN YOU DID NOT HAVE A STEADY PLACE TO SLEEP OR SLEPT IN A SHELTER (INCLUDING NOW)?: NO

## 2025-01-29 SDOH — ECONOMIC STABILITY: TRANSPORTATION INSECURITY
IN THE PAST 12 MONTHS, HAS LACK OF TRANSPORTATION KEPT YOU FROM MEETINGS, WORK, OR FROM GETTING THINGS NEEDED FOR DAILY LIVING?: NO

## 2025-01-29 SDOH — ECONOMIC STABILITY: TRANSPORTATION INSECURITY
IN THE PAST 12 MONTHS, HAS LACK OF RELIABLE TRANSPORTATION KEPT YOU FROM MEDICAL APPOINTMENTS, MEETINGS, WORK OR FROM GETTING THINGS NEEDED FOR DAILY LIVING?: NO

## 2025-01-29 SDOH — ECONOMIC STABILITY: TRANSPORTATION INSECURITY
IN THE PAST 12 MONTHS, HAS THE LACK OF TRANSPORTATION KEPT YOU FROM MEDICAL APPOINTMENTS OR FROM GETTING MEDICATIONS?: NO

## 2025-01-29 SDOH — ECONOMIC STABILITY: FOOD INSECURITY: WITHIN THE PAST 12 MONTHS, THE FOOD YOU BOUGHT JUST DIDN'T LAST AND YOU DIDN'T HAVE MONEY TO GET MORE.: NEVER TRUE

## 2025-01-29 ASSESSMENT — SOCIAL DETERMINANTS OF HEALTH (SDOH)
IN A TYPICAL WEEK, HOW MANY TIMES DO YOU TALK ON THE PHONE WITH FAMILY, FRIENDS, OR NEIGHBORS?: MORE THAN THREE TIMES A WEEK
DO YOU BELONG TO ANY CLUBS OR ORGANIZATIONS SUCH AS CHURCH GROUPS UNIONS, FRATERNAL OR ATHLETIC GROUPS, OR SCHOOL GROUPS?: NO
HOW OFTEN DO YOU ATTEND CHURCH OR RELIGIOUS SERVICES?: NEVER
HOW MANY DRINKS CONTAINING ALCOHOL DO YOU HAVE ON A TYPICAL DAY WHEN YOU ARE DRINKING: 1 OR 2
WITHIN THE PAST 12 MONTHS, YOU WORRIED THAT YOUR FOOD WOULD RUN OUT BEFORE YOU GOT THE MONEY TO BUY MORE: NEVER TRUE
IN THE PAST 12 MONTHS, HAS THE ELECTRIC, GAS, OIL, OR WATER COMPANY THREATENED TO SHUT OFF SERVICE IN YOUR HOME?: NO
DO YOU BELONG TO ANY CLUBS OR ORGANIZATIONS SUCH AS CHURCH GROUPS UNIONS, FRATERNAL OR ATHLETIC GROUPS, OR SCHOOL GROUPS?: NO
HOW OFTEN DO YOU ATTENT MEETINGS OF THE CLUB OR ORGANIZATION YOU BELONG TO?: NEVER
HOW OFTEN DO YOU GET TOGETHER WITH FRIENDS OR RELATIVES?: ONCE A WEEK
ARE YOU MARRIED, WIDOWED, DIVORCED, SEPARATED, NEVER MARRIED, OR LIVING WITH A PARTNER?: LIVING WITH PARTNER
HOW HARD IS IT FOR YOU TO PAY FOR THE VERY BASICS LIKE FOOD, HOUSING, MEDICAL CARE, AND HEATING?: SOMEWHAT HARD
HOW OFTEN DO YOU HAVE A DRINK CONTAINING ALCOHOL: MONTHLY OR LESS
IN A TYPICAL WEEK, HOW MANY TIMES DO YOU TALK ON THE PHONE WITH FAMILY, FRIENDS, OR NEIGHBORS?: MORE THAN THREE TIMES A WEEK
HOW OFTEN DO YOU ATTEND CHURCH OR RELIGIOUS SERVICES?: NEVER
HOW OFTEN DO YOU HAVE SIX OR MORE DRINKS ON ONE OCCASION: NEVER
HOW OFTEN DO YOU GET TOGETHER WITH FRIENDS OR RELATIVES?: ONCE A WEEK
ARE YOU MARRIED, WIDOWED, DIVORCED, SEPARATED, NEVER MARRIED, OR LIVING WITH A PARTNER?: LIVING WITH PARTNER
HOW OFTEN DO YOU ATTENT MEETINGS OF THE CLUB OR ORGANIZATION YOU BELONG TO?: NEVER

## 2025-01-29 ASSESSMENT — LIFESTYLE VARIABLES
AUDIT-C TOTAL SCORE: 1
HOW MANY STANDARD DRINKS CONTAINING ALCOHOL DO YOU HAVE ON A TYPICAL DAY: 1 OR 2
HOW OFTEN DO YOU HAVE SIX OR MORE DRINKS ON ONE OCCASION: NEVER
SKIP TO QUESTIONS 9-10: 1
HOW OFTEN DO YOU HAVE A DRINK CONTAINING ALCOHOL: MONTHLY OR LESS

## 2025-01-30 ENCOUNTER — OFFICE VISIT (OUTPATIENT)
Dept: MEDICAL GROUP | Facility: MEDICAL CENTER | Age: 40
End: 2025-01-30
Attending: FAMILY MEDICINE
Payer: MEDICAID

## 2025-01-30 ENCOUNTER — APPOINTMENT (OUTPATIENT)
Dept: RADIOLOGY | Facility: MEDICAL CENTER | Age: 40
End: 2025-01-30
Payer: MEDICAID

## 2025-01-30 VITALS
SYSTOLIC BLOOD PRESSURE: 104 MMHG | HEIGHT: 66 IN | BODY MASS INDEX: 28.28 KG/M2 | TEMPERATURE: 98.1 F | RESPIRATION RATE: 16 BRPM | HEART RATE: 84 BPM | WEIGHT: 176 LBS | DIASTOLIC BLOOD PRESSURE: 60 MMHG | OXYGEN SATURATION: 97 %

## 2025-01-30 DIAGNOSIS — K92.1 HEMATOCHEZIA: ICD-10-CM

## 2025-01-30 DIAGNOSIS — Z28.20 VACCINE REFUSED BY PATIENT: ICD-10-CM

## 2025-01-30 DIAGNOSIS — Z12.31 VISIT FOR SCREENING MAMMOGRAM: ICD-10-CM

## 2025-01-30 PROCEDURE — 99213 OFFICE O/P EST LOW 20 MIN: CPT | Performed by: FAMILY MEDICINE

## 2025-01-30 PROCEDURE — 77067 SCR MAMMO BI INCL CAD: CPT

## 2025-01-30 ASSESSMENT — FIBROSIS 4 INDEX: FIB4 SCORE: 0.44

## 2025-01-30 NOTE — Clinical Note
Hi there, could you please place referral to GI for patient? She also will likely be making referral request through [employer]. Can also discuss further at upcoming appointment with you.  Thanks, Alena Wan Family Medicine Physician [facility]

## 2025-01-30 NOTE — PROGRESS NOTES
Verbal consent was acquired by the patient to use [employer] ambient listening note generation during this visit.    Subjective   No chief complaint on file.       HPI:   Gwendolyn presents today with    History of Present Illness  The patient presents for evaluation of hematochezia.    She has been experiencing intermittent episodes of hematochezia for approximately one year. She reports a history of hemorrhoids, which are currently not inflamed. The blood is described as bright red and is associated with a burning sensation rather than pain. An episode occurred last night at 8:00 PM, characterized by significant blood loss during defecation, akin to the volume of blood from a nosebleed. This morning, she noticed a small amount of residual bright red blood upon wiping after urination. She does not currently have a gastroenterologist. She has both Preparation H and Anusol at home. She does not believe it is a huge issue since there is not a lot of pain, continuous bleeding, or inflammation.    She declined the influenza vaccine today as she recently had influenza.    Supplemental Information  She has had a hysterectomy. She has 3 children.    FAMILY HISTORY  Her mother had IBS.    MEDICATIONS  Current: Preparation H, Anusol    IMMUNIZATIONS  She declined the influenza vaccine today.      Health Maintenance Due   Topic Date Due    Chickenpox Vaccine (Varicella) (1 of 2 - 13+ 2-dose series) Never done    Hepatitis B Vaccine (Hep B) (1 of 3 - 19+ 3-dose series) Never done    IMM DTaP/Tdap/Td Vaccine (1 - Tdap) Never done    Mammogram  Never done    Influenza Vaccine (1) Never done    COVID-19 Vaccine (1 - 2024-25 season) Never done       Objective   Social History     Tobacco Use    Smoking status: Never    Smokeless tobacco: Never   Vaping Use    Vaping status: Former    Substances: CBD, Started vaping for a week to try to calm anxiety   Substance Use Topics    Alcohol use: Not Currently     Comment: maybe 3 times a  year    Drug use: Not Currently     Types: Marijuana     Comment: tried edibles for pain in Sept       Exam:  LMP 04/27/2015     Physical Exam  Constitutional: Alert, no distress  Skin: No rashes in visible areas  Eye: Conjunctiva clear, lids normal  Respiratory: Unlabored respiratory effort, no cough  MSK: Normal gait, moves all extremities  Psych: Alert and oriented x3, normal affect and mood    Allergies   Allergen Reactions    Amitriptyline Swelling    Trazodone Shortness of Breath    Amoxicillin Hives       [facility] [address] [place] [address] - [facility]  [address] [facility] [address]  Phone: [phone] Fax: [phone]    Current Outpatient Medications   Medication Sig Dispense Refill    methylPREDNISolone (MEDROL DOSEPAK) 4 MG Tablet Therapy Pack As directed on the packaging label. 21 Tablet 0    hydrOXYzine HCl (ATARAX) 25 MG Tab Take 1 Tablet by mouth every 8 hours as needed for Itching. 30 Tablet 0    B Complex-C (VITAMIN B + C COMPLEX) Tab Take 1 Tablet by mouth every day.      vitamin D3 (CHOLECALCIFEROL) 400 UNIT Tab Take 1,000 Units by mouth every day.      famotidine (PEPCID) 40 MG Tab Take 1 Tablet by mouth every day. 30 Tablet 1    valACYclovir (VALTREX) 500 MG Tab Take 1 Tablet by mouth every day. 90 Tablet 2    aspirin 81 MG EC tablet Take 1 Tablet by mouth every day. 30 Tablet 5    valacyclovir (VALTREX) 1 GM Tab TAKE 1 TABLET BY MOUTH ONCE DAILY FOR 5 DAYS AS NEEDED FOR  HERPES  FLAREUP 30 Tablet 0     No current facility-administered medications for this visit.       Assessment & Plan    39 y.o. female with the following -   1. Hematochezia        2. Vaccine refused by patient          Assessment & Plan  1. Hematochezia: Intermittent bright red blood from the rectum for the past year. History of hemorrhoids, but current bleeding is different and not associated with pain or inflammation. Family history of IBS. Patient declined rectal exam. Primarily would like to  just get referral to specialist.  - Referral to a gastroenterologist for further evaluation, including potential colonoscopy or endoscopy, to be initiated through primary care physician for continuity of care.  - Maintain a symptom diary, documenting any instances of bleeding, including photographs if comfortable, to aid in future consultations.  - Use Preparation H or Anusol for any hemorrhoidal flare-ups.  - Seek immediate medical attention if experiencing worsening pain or uncontrolled bleeding.    2. Health maintenance.  - Declined the influenza vaccine today as recently had influenza.  - Recommended to still receive the influenza vaccine as it provides protection against different flu strains. Informed that flu season runs until March or May, and there is still time to potentially get sick again.    Follow-up  - Follow up with PCP Dr. Paula Phillips as scheduled on the 12th.    PROCEDURE  The patient has a history of hysterectomy.      Return if symptoms worsen or fail to improve.    Please be advised that this document was generated using voice recognition software. While I have made reasonable efforts to correct any obvious errors, there may still be grammatical or content inaccuracies that were not identified or corrected prior to finalization.

## 2025-02-12 ENCOUNTER — OFFICE VISIT (OUTPATIENT)
Dept: INTERNAL MEDICINE | Facility: OTHER | Age: 40
End: 2025-02-12
Payer: MEDICAID

## 2025-02-12 VITALS
HEIGHT: 66 IN | HEART RATE: 91 BPM | SYSTOLIC BLOOD PRESSURE: 108 MMHG | WEIGHT: 178.2 LBS | DIASTOLIC BLOOD PRESSURE: 77 MMHG | BODY MASS INDEX: 28.64 KG/M2 | OXYGEN SATURATION: 98 % | TEMPERATURE: 98.1 F

## 2025-02-12 DIAGNOSIS — K64.8 HEMORRHOIDS, INTERNAL, WITH BLEEDING: ICD-10-CM

## 2025-02-12 DIAGNOSIS — I63.9 CARDIOEMBOLIC STROKE (HCC): ICD-10-CM

## 2025-02-12 DIAGNOSIS — Z90.710 S/P LAPAROSCOPIC HYSTERECTOMY: ICD-10-CM

## 2025-02-12 DIAGNOSIS — M54.2 NECK PAIN: ICD-10-CM

## 2025-02-12 DIAGNOSIS — Z86.39 HISTORY OF VITAMIN D DEFICIENCY: ICD-10-CM

## 2025-02-12 DIAGNOSIS — N95.2 VAGINAL ATROPHY: ICD-10-CM

## 2025-02-12 DIAGNOSIS — K62.5 BRIGHT RED BLOOD PER RECTUM: ICD-10-CM

## 2025-02-12 PROCEDURE — 99214 OFFICE O/P EST MOD 30 MIN: CPT | Mod: GC

## 2025-02-12 ASSESSMENT — FIBROSIS 4 INDEX: FIB4 SCORE: 0.44

## 2025-02-12 NOTE — PROGRESS NOTES
Established Patient    Patient Care Team:  Paula Phillips M.D. as PCP - General (Internal Medicine)  Jeremiah Verma, PT, DPT as Physical Therapist (Physical Therapy)    Today's visit and plan was discussed with attending physician, Dr. Gordillo.    Chief Complaint   Patient presents with    Pain     Intermittent pain in neck that travels through left temple then behind left eye. Pain started yesterday. Pain does not last long.        HPI:  Gwendolyn Hernandez is a 39 y.o. female with relevant medical problems of  s/p PFO closure, embolic CVA, Vitamin D deficiency, insomnia, HSV, s/p hysterectomy, s/p cholecystectomy, internal and external hemorrhoids who presents today for two acute concerns.    Sharp pain started yesterday morning that shoots from behind the left ear and goes to left temple. Lasts only a second. No identifiable trigger. Has not slept well over last week due to stress for upcoming job interviews. No changes in sleep positions and no trauma or physical exertion. Patient is concerned about stroke given history but reassured her this is not consistent with stroke.    Blood with wiping without pain and no bowel movement 2-3 times over last . Had hysterectomy. Has hemorrhoids but they were not inflamed. Happens occasionally. Typically occurs after cramping epigastric pain. No anal intercourse nor foreign objects via rectum. Not occurring currently. She feels strongly that bleeding is from rectum as when she wipes more anteriorly there is no blood. No dyspareunia. She does take aspirin for her history of CVA.    Review of systems completed, including 10 pertinent systems, with all other systems negative unless noted above.    Past Medical History:   Diagnosis Date    Anemia     Gynecological disorder     Heart murmur     High cholesterol     PONV (postoperative nausea and vomiting)     Stroke (HCC)        Social History     Tobacco Use    Smoking status: Never    Smokeless tobacco: Never   Vaping Use     "Vaping status: Former    Substances: CBD, Started vaping for a week to try to calm anxiety   Substance Use Topics    Alcohol use: Not Currently     Comment: maybe 3 times a year    Drug use: Not Currently     Types: Marijuana     Comment: tried edibles for pain in Sept       Current Outpatient Medications   Medication Sig Dispense Refill    methylPREDNISolone (MEDROL DOSEPAK) 4 MG Tablet Therapy Pack As directed on the packaging label. 21 Tablet 0    hydrOXYzine HCl (ATARAX) 25 MG Tab Take 1 Tablet by mouth every 8 hours as needed for Itching. 30 Tablet 0    B Complex-C (VITAMIN B + C COMPLEX) Tab Take 1 Tablet by mouth every day.      vitamin D3 (CHOLECALCIFEROL) 400 UNIT Tab Take 1,000 Units by mouth every day.      famotidine (PEPCID) 40 MG Tab Take 1 Tablet by mouth every day. 30 Tablet 1    valACYclovir (VALTREX) 500 MG Tab Take 1 Tablet by mouth every day. 90 Tablet 2    aspirin 81 MG EC tablet Take 1 Tablet by mouth every day. 30 Tablet 5    valacyclovir (VALTREX) 1 GM Tab TAKE 1 TABLET BY MOUTH ONCE DAILY FOR 5 DAYS AS NEEDED FOR  HERPES  FLAREUP 30 Tablet 0     No current facility-administered medications for this visit.       /77 (BP Location: Left arm, Patient Position: Sitting, BP Cuff Size: Adult)   Pulse 91   Temp 36.7 °C (98.1 °F) (Temporal)   Ht 1.664 m (5' 5.51\")   Wt 80.8 kg (178 lb 3.2 oz)   LMP 04/27/2015   SpO2 98%   BMI 29.19 kg/m²     PHYSICAL EXAM:  General: No acute distress, good interaction.   Head and Neck: NC/AT, EOMI, no scleral icterus or conjunctival pallor.  CV: Rhythmic heart sounds, no murmurs, gallops or rubs.  Pulm: Chest expansion is symmetrical, no crackles, rales, rhonchi, or wheezing.  Vaginal exam: digital exam normal. Speculum showed some small areas consistent with vaginal atrophy.   Rectal exam: small non-inflamed external hemorrhoid at rectum. No abnormal masses with CHANDLER and glove without blood. No pain or tenderness with exam.  Skin: No rash, wounds, " bruising. Warm and dry.   MSK: Normal ROM, strength, gait, station. No swelling, tenderness, deformities, atrophy.  Neuro: Patient is alert and oriented x3. Speech is clear and fluent, goal directed. Pupils equal, round and reactive to light. Good eye contact. Extra ocular movements intact. Facial and body symmetry without obvious focal deficits.  Psych: Appropriate mood and affect.      Assessment and Plan:   1. Bright red blood per rectum  2. Hemorrhoids, internal, with bleeding  4. Vaginal atrophy  5. S/P laparoscopic hysterectomy  Recurrent bright red blood per rectum. Patient is certain the bleeding is from her rectum and not vaginal based on how she wipes. From the photos she showed us, it is not a large volume. Has occurred 2-3 times since 10/2024. It is possible that this is related to her hemorrhoids and her life-long aspirin use. Rectal and vaginal exam unremarkable except small external hemorrhoid and small areas of mild erythema in the vaginal canal suggestive of vaginal wall thinning. She denies vaginal dryness. Will monitor. Can not explain preceding epigastric pain but the episodes are very infrequent.  - CBC WITH DIFFERENTIAL; Future  - Referral to Gastroenterology  - URINALYSIS; Future  - Referral to Gastroenterology    7. Neck pain  Sharp shooting pain from behind left ear to left temple starting yesterday. Patient has not been sleeping well due to anxiety surrounding a couple of upcoming job interviews. She has not changed the sleeping position nor had any changes in physical activity.  Strongly suspect these episodes are related to her change in sleep quality.  - Continue to monitor and reach out if symptoms persist even when sleep quality improves  - Stay hydrated  - CMP ordered    3. History of vitamin D deficiency  - Comp Metabolic Panel; Future  - VITAMIN D,25 HYDROXY (DEFICIENCY); Future    6. Cardioembolic stroke (HCC)  - Lipid Profile; Future        Return in about 3 months (around  5/12/2025).      Paula Phillips M.D., PGY-2 Internal Medicine  [facility]    This note was created using voice recognition software.  While every attempt is made to ensure accuracy of transcription, occasionally errors occur.

## 2025-02-14 NOTE — Clinical Note
REFERRAL APPROVAL NOTICE         Sent on February 14, 2025                   Raquel SHERMAN Hernandez  [address] [address]                   Dear Ms. Hernandez,    After a careful review of the medical information and benefit coverage, [facility] has processed your referral. See below for additional details.    If applicable, you must be actively enrolled with your insurance for coverage of the authorized service. If you have any questions regarding your coverage, please contact your insurance directly.    REFERRAL INFORMATION   Referral #:  [id number]  Referred-To Provider    Referred-By Provider:  Gastroenterology    Paula Phillips M.D.   [facility]      [address] [address]  [phone] [address] [facility] [address]  [phone]    Referral Start Date:  02/12/2025  Referral End Date:   02/12/2026             SCHEDULING  If you do not already have an appointment, please call [phone] to make an appointment.     MORE INFORMATION  If you do not already have a [facility] account, sign up at: [email or web address]  You can access your medical information, make appointments, see lab results, billing information, and more.  If you have questions regarding this referral, please contact  the [facility] Referrals department at:             [phone]. Monday - Friday 8:00AM - 5:00PM.     Sincerely,    [facility]

## 2025-02-15 ENCOUNTER — HOSPITAL ENCOUNTER (OUTPATIENT)
Dept: LAB | Facility: MEDICAL CENTER | Age: 40
End: 2025-02-15
Payer: MEDICAID

## 2025-02-15 DIAGNOSIS — R11.0 NAUSEA: ICD-10-CM

## 2025-02-15 DIAGNOSIS — R63.0 DECREASED APPETITE: ICD-10-CM

## 2025-02-15 DIAGNOSIS — I63.9 CARDIOEMBOLIC STROKE (HCC): ICD-10-CM

## 2025-02-15 DIAGNOSIS — R53.83 FATIGUE, UNSPECIFIED TYPE: ICD-10-CM

## 2025-02-15 DIAGNOSIS — K62.5 BRIGHT RED BLOOD PER RECTUM: ICD-10-CM

## 2025-02-15 DIAGNOSIS — R10.9 ABDOMINAL DISCOMFORT: ICD-10-CM

## 2025-02-15 LAB
25(OH)D3 SERPL-MCNC: 17 NG/ML (ref 30–100)
ALBUMIN SERPL BCP-MCNC: 4.1 G/DL (ref 3.2–4.9)
ALBUMIN/GLOB SERPL: 1.5 G/DL
ALP SERPL-CCNC: 54 U/L (ref 30–99)
ALT SERPL-CCNC: 22 U/L (ref 2–50)
ANION GAP SERPL CALC-SCNC: 10 MMOL/L (ref 7–16)
APPEARANCE UR: CLEAR
AST SERPL-CCNC: 17 U/L (ref 12–45)
BASOPHILS # BLD AUTO: 0.9 % (ref 0–1.8)
BASOPHILS # BLD: 0.06 K/UL (ref 0–0.12)
BILIRUB SERPL-MCNC: 0.7 MG/DL (ref 0.1–1.5)
BILIRUB UR QL STRIP.AUTO: NEGATIVE
BUN SERPL-MCNC: 11 MG/DL (ref 8–22)
CALCIUM ALBUM COR SERPL-MCNC: 9.3 MG/DL (ref 8.5–10.5)
CALCIUM SERPL-MCNC: 9.4 MG/DL (ref 8.5–10.5)
CHLORIDE SERPL-SCNC: 108 MMOL/L (ref 96–112)
CHOLEST SERPL-MCNC: 181 MG/DL (ref 100–199)
CO2 SERPL-SCNC: 23 MMOL/L (ref 20–33)
COLOR UR: YELLOW
CREAT SERPL-MCNC: 0.78 MG/DL (ref 0.5–1.4)
EOSINOPHIL # BLD AUTO: 0.1 K/UL (ref 0–0.51)
EOSINOPHIL NFR BLD: 1.6 % (ref 0–6.9)
ERYTHROCYTE [DISTWIDTH] IN BLOOD BY AUTOMATED COUNT: 41.4 FL (ref 35.9–50)
GFR SERPLBLD CREATININE-BSD FMLA CKD-EPI: 98 ML/MIN/1.73 M 2
GLOBULIN SER CALC-MCNC: 2.8 G/DL (ref 1.9–3.5)
GLUCOSE SERPL-MCNC: 86 MG/DL (ref 65–99)
GLUCOSE UR STRIP.AUTO-MCNC: NEGATIVE MG/DL
HCT VFR BLD AUTO: 39.5 % (ref 37–47)
HDLC SERPL-MCNC: 73 MG/DL
HGB BLD-MCNC: 12.9 G/DL (ref 12–16)
IMM GRANULOCYTES # BLD AUTO: 0.02 K/UL (ref 0–0.11)
IMM GRANULOCYTES NFR BLD AUTO: 0.3 % (ref 0–0.9)
KETONES UR STRIP.AUTO-MCNC: NEGATIVE MG/DL
LDLC SERPL CALC-MCNC: 98 MG/DL
LEUKOCYTE ESTERASE UR QL STRIP.AUTO: NEGATIVE
LIPASE SERPL-CCNC: 25 U/L (ref 11–82)
LYMPHOCYTES # BLD AUTO: 2.32 K/UL (ref 1–4.8)
LYMPHOCYTES NFR BLD: 36.2 % (ref 22–41)
MCH RBC QN AUTO: 29.7 PG (ref 27–33)
MCHC RBC AUTO-ENTMCNC: 32.7 G/DL (ref 32.2–35.5)
MCV RBC AUTO: 90.8 FL (ref 81.4–97.8)
MICRO URNS: NORMAL
MONOCYTES # BLD AUTO: 0.47 K/UL (ref 0–0.85)
MONOCYTES NFR BLD AUTO: 7.3 % (ref 0–13.4)
NEUTROPHILS # BLD AUTO: 3.44 K/UL (ref 1.82–7.42)
NEUTROPHILS NFR BLD: 53.7 % (ref 44–72)
NITRITE UR QL STRIP.AUTO: NEGATIVE
NRBC # BLD AUTO: 0 K/UL
NRBC BLD-RTO: 0 /100 WBC (ref 0–0.2)
PH UR STRIP.AUTO: 7 [PH] (ref 5–8)
PLATELET # BLD AUTO: 354 K/UL (ref 164–446)
PMV BLD AUTO: 11.4 FL (ref 9–12.9)
POTASSIUM SERPL-SCNC: 4.7 MMOL/L (ref 3.6–5.5)
PROT SERPL-MCNC: 6.9 G/DL (ref 6–8.2)
PROT UR QL STRIP: NEGATIVE MG/DL
RBC # BLD AUTO: 4.35 M/UL (ref 4.2–5.4)
RBC UR QL AUTO: NEGATIVE
SODIUM SERPL-SCNC: 141 MMOL/L (ref 135–145)
SP GR UR STRIP.AUTO: 1.02
TRIGL SERPL-MCNC: 48 MG/DL (ref 0–149)
UROBILINOGEN UR STRIP.AUTO-MCNC: 0.2 EU/DL
WBC # BLD AUTO: 6.4 K/UL (ref 4.8–10.8)

## 2025-02-15 PROCEDURE — 36415 COLL VENOUS BLD VENIPUNCTURE: CPT

## 2025-02-15 PROCEDURE — 85025 COMPLETE CBC W/AUTO DIFF WBC: CPT

## 2025-02-15 PROCEDURE — 80061 LIPID PANEL: CPT

## 2025-02-15 PROCEDURE — 82306 VITAMIN D 25 HYDROXY: CPT

## 2025-02-15 PROCEDURE — 83690 ASSAY OF LIPASE: CPT

## 2025-02-15 PROCEDURE — 81003 URINALYSIS AUTO W/O SCOPE: CPT

## 2025-02-15 PROCEDURE — 80053 COMPREHEN METABOLIC PANEL: CPT

## 2025-03-28 ENCOUNTER — PATIENT MESSAGE (OUTPATIENT)
Dept: CARDIOLOGY | Facility: MEDICAL CENTER | Age: 40
End: 2025-03-28
Payer: MEDICAID

## 2025-04-30 ENCOUNTER — RESULTS FOLLOW-UP (OUTPATIENT)
Dept: URGENT CARE | Facility: CLINIC | Age: 40
End: 2025-04-30

## 2025-04-30 ENCOUNTER — OFFICE VISIT (OUTPATIENT)
Dept: URGENT CARE | Facility: CLINIC | Age: 40
End: 2025-04-30
Payer: MEDICAID

## 2025-04-30 VITALS
WEIGHT: 182.98 LBS | HEART RATE: 91 BPM | SYSTOLIC BLOOD PRESSURE: 110 MMHG | OXYGEN SATURATION: 95 % | TEMPERATURE: 98 F | DIASTOLIC BLOOD PRESSURE: 62 MMHG | BODY MASS INDEX: 29.41 KG/M2 | HEIGHT: 66 IN | RESPIRATION RATE: 16 BRPM

## 2025-04-30 DIAGNOSIS — R19.7 NAUSEA VOMITING AND DIARRHEA: ICD-10-CM

## 2025-04-30 DIAGNOSIS — R11.2 NAUSEA VOMITING AND DIARRHEA: ICD-10-CM

## 2025-04-30 LAB
FLUAV RNA SPEC QL NAA+PROBE: NEGATIVE
FLUBV RNA SPEC QL NAA+PROBE: NEGATIVE
RSV RNA SPEC QL NAA+PROBE: NEGATIVE
SARS-COV-2 RNA RESP QL NAA+PROBE: NEGATIVE

## 2025-04-30 RX ORDER — ONDANSETRON 4 MG/1
4 TABLET, ORALLY DISINTEGRATING ORAL EVERY 6 HOURS PRN
Qty: 15 TABLET | Refills: 0 | Status: SHIPPED | OUTPATIENT
Start: 2025-04-30

## 2025-04-30 ASSESSMENT — ENCOUNTER SYMPTOMS
SORE THROAT: 1
FEVER: 1
EYE REDNESS: 0
EYE PAIN: 0
EYE DISCHARGE: 0
ABDOMINAL PAIN: 0
CHILLS: 1
COUGH: 0
WHEEZING: 0
NAUSEA: 1
STRIDOR: 0
VOMITING: 1
SHORTNESS OF BREATH: 0
HEADACHES: 1
DIARRHEA: 1
MYALGIAS: 1
SPUTUM PRODUCTION: 0

## 2025-04-30 ASSESSMENT — FIBROSIS 4 INDEX: FIB4 SCORE: 0.42

## 2025-04-30 NOTE — PROGRESS NOTES
Subjective:     Chief Complaint   Patient presents with    Diarrhea     N/V/D w/ Fatigue and body aches since yesterday     Emesis    Body Aches    Fever    Fatigue       HPI:  Gwendolyn Hernandez is a 40 y.o. female who presents for watery/loose stool, nausea, fatigue, fevers and body aches that began yesterday morning. Pt reports having 7 BMs yesterday. Last BM was today x2. No sick contacts. No blood in stool. No mucus in stool. Reports some abd cramping but denies pain. Denies recent abx use or recent hospitalization. Denies drinking water while camping or hiking. Denies recent travel. Denies any bad food. Denies frequent or heavy NSAID use. Denies hx of previous abd surgery.       ROS:  Review of Systems   Constitutional:  Positive for chills, fever and malaise/fatigue.   HENT:  Positive for congestion and sore throat. Negative for ear pain.    Eyes:  Negative for pain, discharge and redness.   Respiratory:  Negative for cough, sputum production, shortness of breath, wheezing and stridor.    Cardiovascular:  Negative for chest pain.   Gastrointestinal:  Positive for diarrhea, nausea and vomiting. Negative for abdominal pain.   Genitourinary:  Negative for dysuria.   Musculoskeletal:  Positive for myalgias.   Skin:  Negative for rash.   Neurological:  Positive for headaches.        CURRENT MEDICATIONS:  Current Outpatient Medications   Medication Sig Refill Last Dispense    aspirin 81 MG EC tablet Take 1 Tablet by mouth every day. 5 Unknown (outside pharmacy)    B Complex-C (VITAMIN B + C COMPLEX) Tab Take 1 Tablet by mouth every day.  Unknown (patient-reported)    valacyclovir (VALTREX) 1 GM Tab TAKE 1 TABLET BY MOUTH ONCE DAILY FOR 5 DAYS AS NEEDED FOR  HERPES  FLAREUP 0 Unknown (outside pharmacy)    valACYclovir (VALTREX) 500 MG Tab Take 1 Tablet by mouth every day. 2 Unknown (outside pharmacy)    vitamin D3 (CHOLECALCIFEROL) 400 UNIT Tab Take 1,000 Units by mouth every day.  Unknown (patient-reported)  "      ALLERGIES:   Allergies   Allergen Reactions    Amitriptyline Swelling    Trazodone Shortness of Breath    Amoxicillin Hives       PROBLEM LIST:    does not have any pertinent problems on file.    Allergies, Medications, & Tobacco/Substance Use were reconciled by the Medical Assistant and reviewed by myself.     Objective:   /62   Pulse 91   Temp 36.7 °C (98 °F) (Temporal)   Resp 16   Ht 1.676 m (5' 6\")   Wt 83 kg (182 lb 15.7 oz)   LMP 04/27/2015   SpO2 95%   BMI 29.53 kg/m²     Physical Exam  Vitals reviewed.   Constitutional:       General: She is not in acute distress.     Appearance: She is not ill-appearing or toxic-appearing.   HENT:      Right Ear: Tympanic membrane normal.      Left Ear: Tympanic membrane normal.      Nose: Nose normal.      Mouth/Throat:      Mouth: Mucous membranes are moist.   Cardiovascular:      Rate and Rhythm: Normal rate and regular rhythm.   Pulmonary:      Effort: Pulmonary effort is normal.      Breath sounds: Normal breath sounds.   Abdominal:      General: Abdomen is flat. Bowel sounds are normal. There is no distension.      Palpations: Abdomen is soft.      Tenderness: There is no abdominal tenderness. There is no guarding or rebound.   Skin:     General: Skin is warm and dry.   Neurological:      Mental Status: She is alert.         Assessment/Plan:   Pt's history and physical exam consistent with nausea/vomiting/diarrhea. No s/sx of dehydration. No report abd pain and abd exam was benign. I believe the pt's symptoms are most likely due to viral illness which will improve on its own. Supportive treatmet discussed.  Assessment & Plan  Nausea vomiting and diarrhea  Orders:    ondansetron (ZOFRAN ODT) 4 MG TABLET DISPERSIBLE; Take 1 Tablet by mouth every 6 hours as needed for Nausea/Vomiting for up to 15 doses.    POCT CEPHEID COV-2, FLU A/B, RSV - PCR  - Increase fluids  - Rest  - Advance diet as tolerated starting with bland, low fat meals. Boiled starches " and cereals with salt are indicated in patients with watery diarrhea; crackers, bananas, soup, and boiled vegetables may also be consumed.   - Avoid alcohol, coffee, nicotine, and spicy foods  - Medication as prescribed  - Follow up with PCP or clinic in 3 days if not feeling better.      Discussed differential diagnosis, management options, risks/benefits, and alternatives to planned treatment. Pt expressed understanding and the treatment plan was agreed upon. Questions were encouraged and answered. Pt encouraged to return to urgent care as needed if new or worsening symptoms or if there is no improvement in condition. Pt educated in red flags and indications to immediately call 911 or present to the Emergency Department. Advised the patient to follow-up with the primary care physician for recheck, reevaluation, and further management.    I personally reviewed prior external notes and test results pertinent to today's visit. I have independently reviewed and interpreted all diagnostics ordered during this visit.    Please note that this dictation was created using voice recognition software. I have made a reasonable attempt to correct obvious errors, but I expect that there are errors of grammar and possibly content that I did not discover before finalizing the note.    This note was electronically signed by ZAKIA Ibrahim

## 2025-04-30 NOTE — LETTER
April 30, 2025         Patient: Gwendolyn Hernandez   YOB: 1985   Date of Visit: 4/30/2025           To Whom it May Concern:    Gwendolyn Hernandez was seen in my clinic on 4/30/2025. She should be excused from work 4/29/25-5/1/25 and can return when she is feeling better.     If you have any questions or concerns, please don't hesitate to call.        Sincerely,           DASIA Ash.  Electronically Signed

## 2025-06-23 ENCOUNTER — TELEPHONE (OUTPATIENT)
Dept: CARDIOLOGY | Facility: MEDICAL CENTER | Age: 40
End: 2025-06-23
Payer: MEDICAID

## 2025-06-23 NOTE — LETTER
June 23, 2025        Raquel Hernandez    PROCEDURE/SURGERY CLEARANCE FORM    Date: 6/23/2025   Patient Name: Gwendolyn Hernandez    Dear Surgeon or Proceduralist,     The above patient is cleared to have the following procedure/surgery:  Colonoscopy with Hem Banding     Thank you for your request for cardiac stratification of our mutual patient Gwendolyn Hernandez 1985. We have reviewed their [facility] records; and to the best of our understanding this patient has not had stenting, ablation, watchman, cardiothoracic surgery or hospitalization for cardiovascular reasons in the past 6 months.  Gwendolyn Hernandez has been seen within the past 15 months and is considered to have non-modifiable cardiac risk for this low-risk procedure/surgery. They may proceed from a cardiovascular standpoint and may hold their antiplatelet/anticoagulation as briefly as possible. Please have patient resume this medication when hemodynamically stable to do so.     Aspirin or Prasugrel   - hold 7 days prior to procedure/surgery, resume when hemodynamically stable      Clopidrogrel or Ticagrelor  - hold 7 days for all neurological procedures, hold 5 days prior to all other procedure/surgery,  resume when hemodynamically stable     Warfarin - hold 7 days for all neurological procedures, hold 5 days prior to all other procedure/surgery and coordinate with [facility]ticoagulation [facility] ([phone]) INR testing and dose management.      Pradaxa/Xarelto/Eliquis/Savesya - hold 1 day prior to procedure for low bleeding risk procedure, 2 days for high bleeding risk procedure, or consider holding 3 days or longer for patients with reduced kidney function (CrCl <30mL/min) or spinal/cranial surgeries/procedures.      If they have a mechanical heart valve, please coordinate with [facility] Anticoagulation Service ([phone]) the proper management of their anticoagulant in the periprocedural or perioperative period.      Some patients have higher risk for  cardiovascular complications or holding medication. If our patient has had prior complications of holding antiplatelet or anticoagulants in the past and we have seen them after these events, we have addressed these concerns with the patient. They are at an unknown degree of increased risk for recurrent complication.  You may hold anticoagulation/antiplatelets for the procedure or surgery if the benefits of the procedure or surgery outweigh this nonmodifiable risk.      If Gwendolyn Hernandez 1985 has new symptoms of heart failure decompensation, unstable arrythmia, or angina please reach out and we will assess the patient.      If you have other patient-specific concerns, please feel free to reach out to the patient's cardiologist directly at [phone].     Thank you,   [facility]    __ _Electronically signed________  Provider: Corin FORMAN

## 2025-06-23 NOTE — TELEPHONE ENCOUNTER
Last OV: 7/9/24  Proposed Surgery: Colonoscopy with Hem Banding  Surgery Date: 8/1/25  Requesting Office Name: [facility]  Fax Number: [phone]  Preference of Location (default is surgery center unless specified by Cardiologist or ALFREDA)  Prior Clearance Addressed: No    Is this a general clearance? YES   Anticoags/Antiplatelets: Aspirin  Anticoags/Antiplatelet managed by Cardiology? YES    Outstanding Cardiac Imaging : No  Ablation, Cardioversion, Stent, Cardiac Devices, Catheterization, Watchman: Yes  Date : 4/19/24   TAVR/Valve, Mitral Clip, Watchman (including open heart),: Completed over 6 months post procedure- Send clearance letter    Recent Cardiac Hospitalization: No            When: Greater than 3 months since hospitalization   History (cardiac history): Past Medical History[1]        Is this a dental clearance? NO  Ablation, Cardioversion, Watchman, Stents, Cath, Devices within the last 3 months? No   If yes- Send dental wait letter, do not forward to provider for review.     TAVR / Valve, Mitral clip within the last 6 months? No  If yes- Send dental wait letter, do not forward to provider for review.     If completing a general clearance, continue per protocol.           Surgical Clearance Letter Sent: YES   **Scan clearance request letter into [employer].**        [1]   Past Medical History:  Diagnosis Date    Anemia     Gynecological disorder     Heart murmur     High cholesterol     PONV (postoperative nausea and vomiting)     Stroke (HCC)

## 2025-07-01 ENCOUNTER — TELEPHONE (OUTPATIENT)
Facility: MEDICAL CENTER | Age: 40
End: 2025-07-01
Payer: COMMERCIAL

## 2025-07-01 NOTE — TELEPHONE ENCOUNTER
Patient daughter send a my chart message asking  to be schedule an appointment for Dr. Clayton as soon as possible, any available time was ok. I called and left a voice message for 9/17/25 at 3:40 pm for 3:20 pm check in at the [address] [place] [address] [address]..Giovanna Aldana, Med Ass't

## 2025-07-28 ENCOUNTER — APPOINTMENT (OUTPATIENT)
Dept: MEDICAL GROUP | Facility: MEDICAL CENTER | Age: 40
End: 2025-07-28
Payer: COMMERCIAL